# Patient Record
Sex: FEMALE | Race: WHITE | URBAN - METROPOLITAN AREA
[De-identification: names, ages, dates, MRNs, and addresses within clinical notes are randomized per-mention and may not be internally consistent; named-entity substitution may affect disease eponyms.]

---

## 2022-01-20 LAB
ABO, EXTERNAL RESULT: NORMAL
C. TRACHOMATIS, EXTERNAL RESULT: NEGATIVE
HEP B, EXTERNAL RESULT: NEGATIVE
HIV, EXTERNAL RESULT: NORMAL
N. GONORRHOEAE, EXTERNAL RESULT: NEGATIVE
RH FACTOR, EXTERNAL RESULT: NORMAL
RPR, EXTERNAL RESULT: NORMAL
RUBELLA TITER, EXTERNAL RESULT: NORMAL

## 2022-07-19 LAB — GBS, EXTERNAL RESULT: NEGATIVE

## 2022-07-30 ENCOUNTER — HOSPITAL ENCOUNTER (INPATIENT)
Age: 37
LOS: 3 days | Discharge: HOME | End: 2022-08-03
Attending: OBSTETRICS & GYNECOLOGY | Admitting: OBSTETRICS & GYNECOLOGY
Payer: COMMERCIAL

## 2022-07-30 PROCEDURE — 80307 DRUG TEST PRSMV CHEM ANLYZR: CPT

## 2022-07-30 PROCEDURE — 81001 URINALYSIS AUTO W/SCOPE: CPT

## 2022-07-30 RX ORDER — ONDANSETRON 2 MG/ML
4 INJECTION INTRAMUSCULAR; INTRAVENOUS EVERY 6 HOURS PRN
Status: DISCONTINUED | OUTPATIENT
Start: 2022-07-30 | End: 2022-07-31 | Stop reason: HOSPADM

## 2022-07-30 RX ORDER — FAMOTIDINE 10 MG/ML
20 INJECTION, SOLUTION INTRAVENOUS 2 TIMES DAILY PRN
Status: DISCONTINUED | OUTPATIENT
Start: 2022-07-30 | End: 2022-07-31 | Stop reason: HOSPADM

## 2022-07-30 RX ORDER — LEVOTHYROXINE SODIUM 0.1 MG/1
100 TABLET ORAL DAILY
COMMUNITY

## 2022-07-31 ENCOUNTER — ANESTHESIA (OUTPATIENT)
Dept: LABOR AND DELIVERY | Age: 37
End: 2022-07-31
Payer: COMMERCIAL

## 2022-07-31 ENCOUNTER — ANESTHESIA EVENT (OUTPATIENT)
Dept: LABOR AND DELIVERY | Age: 37
End: 2022-07-31
Payer: COMMERCIAL

## 2022-07-31 LAB
ABO/RH: NORMAL
AMPHETAMINES: NEGATIVE
ANTIBODY SCREEN: NEGATIVE
BACTERIA: ABNORMAL /HPF
BARBITURATE SCREEN URINE: NEGATIVE
BENZODIAZEPINE SCREEN, URINE: NEGATIVE
BILIRUBIN URINE: NEGATIVE MG/DL
BLOOD, URINE: ABNORMAL
CALCIUM OXALATE CRYSTALS: ABNORMAL /HPF
CANNABINOID SCREEN URINE: NEGATIVE
CLARITY: ABNORMAL
COCAINE METABOLITE: NEGATIVE
COLOR: YELLOW
GLUCOSE, URINE: NEGATIVE MG/DL
HCT VFR BLD CALC: 32.1 % (ref 37–47)
HEMOGLOBIN: 9.6 GM/DL (ref 12.5–16)
KETONES, URINE: NEGATIVE MG/DL
LEUKOCYTE ESTERASE, URINE: ABNORMAL
MCH RBC QN AUTO: 20.6 PG (ref 27–31)
MCHC RBC AUTO-ENTMCNC: 29.9 % (ref 32–36)
MCV RBC AUTO: 69 FL (ref 78–100)
MUCUS: ABNORMAL HPF
NITRITE URINE, QUANTITATIVE: NEGATIVE
OPIATES, URINE: NEGATIVE
OXYCODONE: NEGATIVE
PDW BLD-RTO: 19.4 % (ref 11.7–14.9)
PH, URINE: 7 (ref 5–8)
PHENCYCLIDINE, URINE: NEGATIVE
PLATELET # BLD: 303 K/CU MM (ref 140–440)
PMV BLD AUTO: 9.5 FL (ref 7.5–11.1)
PROTEIN UA: ABNORMAL MG/DL
RBC # BLD: 4.65 M/CU MM (ref 4.2–5.4)
RBC URINE: 102 /HPF (ref 0–6)
SPECIFIC GRAVITY UA: 1 (ref 1–1.03)
SQUAMOUS EPITHELIAL: 28 /HPF
UROBILINOGEN, URINE: 0.2 MG/DL (ref 0.2–1)
WBC # BLD: 8.5 K/CU MM (ref 4–10.5)
WBC CLUMP: ABNORMAL /HPF
WBC UA: 4 /HPF (ref 0–5)
YEAST: ABNORMAL /HPF

## 2022-07-31 PROCEDURE — 7100000000 HC PACU RECOVERY - FIRST 15 MIN: Performed by: OBSTETRICS & GYNECOLOGY

## 2022-07-31 PROCEDURE — 51702 INSERT TEMP BLADDER CATH: CPT

## 2022-07-31 PROCEDURE — 6360000002 HC RX W HCPCS: Performed by: NURSE ANESTHETIST, CERTIFIED REGISTERED

## 2022-07-31 PROCEDURE — 6360000002 HC RX W HCPCS: Performed by: OBSTETRICS & GYNECOLOGY

## 2022-07-31 PROCEDURE — 2500000003 HC RX 250 WO HCPCS: Performed by: NURSE ANESTHETIST, CERTIFIED REGISTERED

## 2022-07-31 PROCEDURE — 86900 BLOOD TYPING SEROLOGIC ABO: CPT

## 2022-07-31 PROCEDURE — 2580000003 HC RX 258: Performed by: OBSTETRICS & GYNECOLOGY

## 2022-07-31 PROCEDURE — 6370000000 HC RX 637 (ALT 250 FOR IP): Performed by: OBSTETRICS & GYNECOLOGY

## 2022-07-31 PROCEDURE — 1220000000 HC SEMI PRIVATE OB R&B

## 2022-07-31 PROCEDURE — 85027 COMPLETE CBC AUTOMATED: CPT

## 2022-07-31 PROCEDURE — 3700000001 HC ADD 15 MINUTES (ANESTHESIA): Performed by: OBSTETRICS & GYNECOLOGY

## 2022-07-31 PROCEDURE — 86850 RBC ANTIBODY SCREEN: CPT

## 2022-07-31 PROCEDURE — 7100000001 HC PACU RECOVERY - ADDTL 15 MIN: Performed by: OBSTETRICS & GYNECOLOGY

## 2022-07-31 PROCEDURE — 59514 CESAREAN DELIVERY ONLY: CPT | Performed by: OBSTETRICS & GYNECOLOGY

## 2022-07-31 PROCEDURE — 3700000000 HC ANESTHESIA ATTENDED CARE: Performed by: OBSTETRICS & GYNECOLOGY

## 2022-07-31 PROCEDURE — 86901 BLOOD TYPING SEROLOGIC RH(D): CPT

## 2022-07-31 PROCEDURE — 3609079900 HC CESAREAN SECTION: Performed by: OBSTETRICS & GYNECOLOGY

## 2022-07-31 PROCEDURE — 3700000025 EPIDURAL BLOCK: Performed by: NURSE ANESTHETIST, CERTIFIED REGISTERED

## 2022-07-31 PROCEDURE — 84112 EVAL AMNIOTIC FLUID PROTEIN: CPT

## 2022-07-31 RX ORDER — FAMOTIDINE 20 MG/1
20 TABLET, FILM COATED ORAL 2 TIMES DAILY PRN
Status: DISCONTINUED | OUTPATIENT
Start: 2022-07-31 | End: 2022-08-03 | Stop reason: HOSPADM

## 2022-07-31 RX ORDER — METHYLERGONOVINE MALEATE 0.2 MG/ML
200 INJECTION INTRAVENOUS PRN
Status: DISCONTINUED | OUTPATIENT
Start: 2022-07-31 | End: 2022-07-31

## 2022-07-31 RX ORDER — OXYCODONE HYDROCHLORIDE 5 MG/1
10 TABLET ORAL EVERY 4 HOURS PRN
Status: DISCONTINUED | OUTPATIENT
Start: 2022-07-31 | End: 2022-08-03 | Stop reason: HOSPADM

## 2022-07-31 RX ORDER — MORPHINE SULFATE 0.5 MG/ML
INJECTION, SOLUTION EPIDURAL; INTRATHECAL; INTRAVENOUS PRN
Status: DISCONTINUED | OUTPATIENT
Start: 2022-07-31 | End: 2022-07-31 | Stop reason: SDUPTHER

## 2022-07-31 RX ORDER — KETOROLAC TROMETHAMINE 30 MG/ML
30 INJECTION, SOLUTION INTRAMUSCULAR; INTRAVENOUS EVERY 6 HOURS PRN
Status: CANCELLED | OUTPATIENT
Start: 2022-07-31 | End: 2022-08-05

## 2022-07-31 RX ORDER — SODIUM CHLORIDE, SODIUM LACTATE, POTASSIUM CHLORIDE, CALCIUM CHLORIDE 600; 310; 30; 20 MG/100ML; MG/100ML; MG/100ML; MG/100ML
INJECTION, SOLUTION INTRAVENOUS CONTINUOUS
Status: DISCONTINUED | OUTPATIENT
Start: 2022-07-31 | End: 2022-07-31

## 2022-07-31 RX ORDER — ENOXAPARIN SODIUM 100 MG/ML
40 INJECTION SUBCUTANEOUS EVERY 24 HOURS
Status: DISCONTINUED | OUTPATIENT
Start: 2022-08-01 | End: 2022-08-03 | Stop reason: HOSPADM

## 2022-07-31 RX ORDER — SODIUM CHLORIDE 9 MG/ML
INJECTION, SOLUTION INTRAVENOUS PRN
Status: DISCONTINUED | OUTPATIENT
Start: 2022-07-31 | End: 2022-08-03 | Stop reason: HOSPADM

## 2022-07-31 RX ORDER — NALOXONE HYDROCHLORIDE 0.4 MG/ML
INJECTION, SOLUTION INTRAMUSCULAR; INTRAVENOUS; SUBCUTANEOUS PRN
Status: DISCONTINUED | OUTPATIENT
Start: 2022-07-31 | End: 2022-07-31 | Stop reason: HOSPADM

## 2022-07-31 RX ORDER — LIDOCAINE HYDROCHLORIDE AND EPINEPHRINE 20; 5 MG/ML; UG/ML
INJECTION, SOLUTION EPIDURAL; INFILTRATION; INTRACAUDAL; PERINEURAL PRN
Status: DISCONTINUED | OUTPATIENT
Start: 2022-07-31 | End: 2022-07-31 | Stop reason: SDUPTHER

## 2022-07-31 RX ORDER — SIMETHICONE 80 MG
80 TABLET,CHEWABLE ORAL EVERY 6 HOURS PRN
Status: DISCONTINUED | OUTPATIENT
Start: 2022-07-31 | End: 2022-08-03 | Stop reason: HOSPADM

## 2022-07-31 RX ORDER — DIPHENHYDRAMINE HYDROCHLORIDE 50 MG/ML
25 INJECTION INTRAMUSCULAR; INTRAVENOUS EVERY 6 HOURS PRN
Status: DISCONTINUED | OUTPATIENT
Start: 2022-07-31 | End: 2022-08-03 | Stop reason: HOSPADM

## 2022-07-31 RX ORDER — ONDANSETRON 2 MG/ML
4 INJECTION INTRAMUSCULAR; INTRAVENOUS EVERY 6 HOURS PRN
Status: DISCONTINUED | OUTPATIENT
Start: 2022-07-31 | End: 2022-07-31

## 2022-07-31 RX ORDER — SODIUM CHLORIDE 0.9 % (FLUSH) 0.9 %
5-40 SYRINGE (ML) INJECTION EVERY 12 HOURS SCHEDULED
Status: DISCONTINUED | OUTPATIENT
Start: 2022-07-31 | End: 2022-07-31

## 2022-07-31 RX ORDER — SODIUM CHLORIDE 0.9 % (FLUSH) 0.9 %
10 SYRINGE (ML) INJECTION PRN
Status: DISCONTINUED | OUTPATIENT
Start: 2022-07-31 | End: 2022-07-31

## 2022-07-31 RX ORDER — ONDANSETRON 4 MG/1
8 TABLET, ORALLY DISINTEGRATING ORAL EVERY 8 HOURS PRN
Status: DISCONTINUED | OUTPATIENT
Start: 2022-07-31 | End: 2022-08-03 | Stop reason: HOSPADM

## 2022-07-31 RX ORDER — LEVOTHYROXINE SODIUM 0.1 MG/1
100 TABLET ORAL DAILY
Status: DISCONTINUED | OUTPATIENT
Start: 2022-08-01 | End: 2022-08-03 | Stop reason: HOSPADM

## 2022-07-31 RX ORDER — SODIUM CHLORIDE 9 MG/ML
INJECTION, SOLUTION INTRAVENOUS PRN
Status: DISCONTINUED | OUTPATIENT
Start: 2022-07-31 | End: 2022-07-31

## 2022-07-31 RX ORDER — LANOLIN 100 %
OINTMENT (GRAM) TOPICAL
Status: DISCONTINUED | OUTPATIENT
Start: 2022-07-31 | End: 2022-08-03 | Stop reason: HOSPADM

## 2022-07-31 RX ORDER — KETOROLAC TROMETHAMINE 30 MG/ML
INJECTION, SOLUTION INTRAMUSCULAR; INTRAVENOUS PRN
Status: DISCONTINUED | OUTPATIENT
Start: 2022-07-31 | End: 2022-07-31 | Stop reason: SDUPTHER

## 2022-07-31 RX ORDER — SODIUM CHLORIDE 9 MG/ML
25 INJECTION, SOLUTION INTRAVENOUS PRN
Status: DISCONTINUED | OUTPATIENT
Start: 2022-07-31 | End: 2022-07-31

## 2022-07-31 RX ORDER — SODIUM CHLORIDE 0.9 % (FLUSH) 0.9 %
10 SYRINGE (ML) INJECTION EVERY 12 HOURS SCHEDULED
Status: DISCONTINUED | OUTPATIENT
Start: 2022-07-31 | End: 2022-07-31

## 2022-07-31 RX ORDER — CARBOPROST TROMETHAMINE 250 UG/ML
250 INJECTION, SOLUTION INTRAMUSCULAR PRN
Status: DISCONTINUED | OUTPATIENT
Start: 2022-07-31 | End: 2022-07-31

## 2022-07-31 RX ORDER — ROPIVACAINE HYDROCHLORIDE 2 MG/ML
12 INJECTION, SOLUTION EPIDURAL; INFILTRATION; PERINEURAL CONTINUOUS
Status: DISCONTINUED | OUTPATIENT
Start: 2022-07-31 | End: 2022-07-31

## 2022-07-31 RX ORDER — FENTANYL CITRATE 50 UG/ML
INJECTION, SOLUTION INTRAMUSCULAR; INTRAVENOUS PRN
Status: DISCONTINUED | OUTPATIENT
Start: 2022-07-31 | End: 2022-07-31 | Stop reason: SDUPTHER

## 2022-07-31 RX ORDER — KETOROLAC TROMETHAMINE 30 MG/ML
30 INJECTION, SOLUTION INTRAMUSCULAR; INTRAVENOUS EVERY 6 HOURS
Status: DISPENSED | OUTPATIENT
Start: 2022-07-31 | End: 2022-08-01

## 2022-07-31 RX ORDER — SODIUM CHLORIDE, SODIUM LACTATE, POTASSIUM CHLORIDE, AND CALCIUM CHLORIDE .6; .31; .03; .02 G/100ML; G/100ML; G/100ML; G/100ML
500 INJECTION, SOLUTION INTRAVENOUS PRN
Status: DISCONTINUED | OUTPATIENT
Start: 2022-07-31 | End: 2022-07-31

## 2022-07-31 RX ORDER — SWAB
1 SWAB, NON-MEDICATED MISCELLANEOUS DAILY
Status: DISCONTINUED | OUTPATIENT
Start: 2022-08-01 | End: 2022-08-03 | Stop reason: HOSPADM

## 2022-07-31 RX ORDER — SODIUM CHLORIDE 0.9 % (FLUSH) 0.9 %
5-40 SYRINGE (ML) INJECTION EVERY 12 HOURS SCHEDULED
Status: DISCONTINUED | OUTPATIENT
Start: 2022-07-31 | End: 2022-08-03 | Stop reason: HOSPADM

## 2022-07-31 RX ORDER — SODIUM CHLORIDE, SODIUM LACTATE, POTASSIUM CHLORIDE, CALCIUM CHLORIDE 600; 310; 30; 20 MG/100ML; MG/100ML; MG/100ML; MG/100ML
INJECTION, SOLUTION INTRAVENOUS CONTINUOUS
Status: DISCONTINUED | OUTPATIENT
Start: 2022-07-31 | End: 2022-08-03 | Stop reason: HOSPADM

## 2022-07-31 RX ORDER — METHYLERGONOVINE MALEATE 0.2 MG/ML
200 INJECTION INTRAVENOUS PRN
Status: DISCONTINUED | OUTPATIENT
Start: 2022-07-31 | End: 2022-08-03 | Stop reason: HOSPADM

## 2022-07-31 RX ORDER — ACETAMINOPHEN 500 MG
1000 TABLET ORAL EVERY 8 HOURS
Status: DISCONTINUED | OUTPATIENT
Start: 2022-07-31 | End: 2022-08-03 | Stop reason: HOSPADM

## 2022-07-31 RX ORDER — BISACODYL 10 MG
10 SUPPOSITORY, RECTAL RECTAL DAILY PRN
Status: DISCONTINUED | OUTPATIENT
Start: 2022-07-31 | End: 2022-08-03 | Stop reason: HOSPADM

## 2022-07-31 RX ORDER — SODIUM CHLORIDE 0.9 % (FLUSH) 0.9 %
5-40 SYRINGE (ML) INJECTION PRN
Status: DISCONTINUED | OUTPATIENT
Start: 2022-07-31 | End: 2022-08-03 | Stop reason: HOSPADM

## 2022-07-31 RX ORDER — SODIUM CHLORIDE 0.9 % (FLUSH) 0.9 %
5-40 SYRINGE (ML) INJECTION EVERY 12 HOURS SCHEDULED
Status: CANCELLED | OUTPATIENT
Start: 2022-07-31

## 2022-07-31 RX ORDER — SODIUM CHLORIDE 0.9 % (FLUSH) 0.9 %
5-40 SYRINGE (ML) INJECTION PRN
Status: DISCONTINUED | OUTPATIENT
Start: 2022-07-31 | End: 2022-07-31

## 2022-07-31 RX ORDER — DOCUSATE SODIUM 100 MG/1
100 CAPSULE, LIQUID FILLED ORAL 2 TIMES DAILY
Status: DISCONTINUED | OUTPATIENT
Start: 2022-07-31 | End: 2022-08-03 | Stop reason: HOSPADM

## 2022-07-31 RX ORDER — ROPIVACAINE HYDROCHLORIDE 2 MG/ML
INJECTION, SOLUTION EPIDURAL; INFILTRATION; PERINEURAL CONTINUOUS PRN
Status: DISCONTINUED | OUTPATIENT
Start: 2022-07-31 | End: 2022-07-31 | Stop reason: SDUPTHER

## 2022-07-31 RX ORDER — DOCUSATE SODIUM 100 MG/1
100 CAPSULE, LIQUID FILLED ORAL 2 TIMES DAILY
Status: DISCONTINUED | OUTPATIENT
Start: 2022-07-31 | End: 2022-07-31 | Stop reason: HOSPADM

## 2022-07-31 RX ORDER — FENTANYL CITRATE 50 UG/ML
25 INJECTION, SOLUTION INTRAMUSCULAR; INTRAVENOUS EVERY 5 MIN PRN
Status: CANCELLED | OUTPATIENT
Start: 2022-07-31

## 2022-07-31 RX ORDER — SODIUM CHLORIDE, SODIUM LACTATE, POTASSIUM CHLORIDE, AND CALCIUM CHLORIDE .6; .31; .03; .02 G/100ML; G/100ML; G/100ML; G/100ML
1000 INJECTION, SOLUTION INTRAVENOUS PRN
Status: DISCONTINUED | OUTPATIENT
Start: 2022-07-31 | End: 2022-07-31

## 2022-07-31 RX ORDER — ROPIVACAINE HYDROCHLORIDE 2 MG/ML
INJECTION, SOLUTION EPIDURAL; INFILTRATION; PERINEURAL PRN
Status: DISCONTINUED | OUTPATIENT
Start: 2022-07-31 | End: 2022-07-31 | Stop reason: SDUPTHER

## 2022-07-31 RX ORDER — SODIUM CHLORIDE, SODIUM LACTATE, POTASSIUM CHLORIDE, AND CALCIUM CHLORIDE .6; .31; .03; .02 G/100ML; G/100ML; G/100ML; G/100ML
1000 INJECTION, SOLUTION INTRAVENOUS ONCE
Status: DISCONTINUED | OUTPATIENT
Start: 2022-07-31 | End: 2022-07-31

## 2022-07-31 RX ORDER — ONDANSETRON 2 MG/ML
INJECTION INTRAMUSCULAR; INTRAVENOUS PRN
Status: DISCONTINUED | OUTPATIENT
Start: 2022-07-31 | End: 2022-07-31 | Stop reason: SDUPTHER

## 2022-07-31 RX ORDER — ONDANSETRON 2 MG/ML
4 INJECTION INTRAMUSCULAR; INTRAVENOUS EVERY 6 HOURS PRN
Status: DISCONTINUED | OUTPATIENT
Start: 2022-07-31 | End: 2022-08-03 | Stop reason: HOSPADM

## 2022-07-31 RX ORDER — OXYCODONE HYDROCHLORIDE 5 MG/1
5 TABLET ORAL EVERY 4 HOURS PRN
Status: DISCONTINUED | OUTPATIENT
Start: 2022-07-31 | End: 2022-08-03 | Stop reason: HOSPADM

## 2022-07-31 RX ORDER — IBUPROFEN 800 MG/1
800 TABLET ORAL EVERY 8 HOURS
Status: DISCONTINUED | OUTPATIENT
Start: 2022-08-01 | End: 2022-07-31

## 2022-07-31 RX ORDER — TRANEXAMIC ACID 10 MG/ML
1000 INJECTION, SOLUTION INTRAVENOUS
Status: DISCONTINUED | OUTPATIENT
Start: 2022-07-31 | End: 2022-07-31

## 2022-07-31 RX ORDER — MISOPROSTOL 200 UG/1
800 TABLET ORAL PRN
Status: DISCONTINUED | OUTPATIENT
Start: 2022-07-31 | End: 2022-08-03 | Stop reason: HOSPADM

## 2022-07-31 RX ORDER — TRISODIUM CITRATE DIHYDRATE AND CITRIC ACID MONOHYDRATE 500; 334 MG/5ML; MG/5ML
30 SOLUTION ORAL ONCE
Status: COMPLETED | OUTPATIENT
Start: 2022-07-31 | End: 2022-07-31

## 2022-07-31 RX ORDER — IBUPROFEN 800 MG/1
800 TABLET ORAL EVERY 8 HOURS
Status: DISCONTINUED | OUTPATIENT
Start: 2022-08-02 | End: 2022-08-03 | Stop reason: HOSPADM

## 2022-07-31 RX ORDER — MISOPROSTOL 200 UG/1
800 TABLET ORAL PRN
Status: DISCONTINUED | OUTPATIENT
Start: 2022-07-31 | End: 2022-07-31

## 2022-07-31 RX ORDER — FENTANYL CITRATE 50 UG/ML
100 INJECTION, SOLUTION INTRAMUSCULAR; INTRAVENOUS
Status: DISCONTINUED | OUTPATIENT
Start: 2022-07-31 | End: 2022-07-31 | Stop reason: HOSPADM

## 2022-07-31 RX ORDER — LIDOCAINE HYDROCHLORIDE AND EPINEPHRINE 15; 5 MG/ML; UG/ML
INJECTION, SOLUTION EPIDURAL PRN
Status: DISCONTINUED | OUTPATIENT
Start: 2022-07-31 | End: 2022-07-31 | Stop reason: SDUPTHER

## 2022-07-31 RX ORDER — FENTANYL CITRATE 50 UG/ML
50 INJECTION, SOLUTION INTRAMUSCULAR; INTRAVENOUS EVERY 5 MIN PRN
Status: CANCELLED | OUTPATIENT
Start: 2022-07-31

## 2022-07-31 RX ORDER — NICOTINE 21 MG/24HR
1 PATCH, TRANSDERMAL 24 HOURS TRANSDERMAL DAILY
Status: DISCONTINUED | OUTPATIENT
Start: 2022-07-31 | End: 2022-08-03 | Stop reason: HOSPADM

## 2022-07-31 RX ORDER — LIDOCAINE HYDROCHLORIDE 10 MG/ML
30 INJECTION, SOLUTION EPIDURAL; INFILTRATION; INTRACAUDAL; PERINEURAL PRN
Status: DISCONTINUED | OUTPATIENT
Start: 2022-07-31 | End: 2022-07-31

## 2022-07-31 RX ADMIN — ONDANSETRON 4 MG: 2 INJECTION INTRAMUSCULAR; INTRAVENOUS at 17:00

## 2022-07-31 RX ADMIN — FENTANYL CITRATE 50 MCG: 50 INJECTION, SOLUTION INTRAMUSCULAR; INTRAVENOUS at 10:46

## 2022-07-31 RX ADMIN — KETOROLAC TROMETHAMINE 30 MG: 30 INJECTION, SOLUTION INTRAMUSCULAR at 16:43

## 2022-07-31 RX ADMIN — ROPIVACAINE HYDROCHLORIDE 10 ML/HR: 2 INJECTION, SOLUTION EPIDURAL; INFILTRATION; PERINEURAL at 02:12

## 2022-07-31 RX ADMIN — ACETAMINOPHEN 1000 MG: 500 TABLET ORAL at 21:08

## 2022-07-31 RX ADMIN — SODIUM CITRATE AND CITRIC ACID MONOHYDRATE 30 ML: 500; 334 SOLUTION ORAL at 15:43

## 2022-07-31 RX ADMIN — ONDANSETRON 4 MG: 2 INJECTION INTRAMUSCULAR; INTRAVENOUS at 15:44

## 2022-07-31 RX ADMIN — FENTANYL CITRATE 50 MCG: 50 INJECTION, SOLUTION INTRAMUSCULAR; INTRAVENOUS at 14:40

## 2022-07-31 RX ADMIN — AZITHROMYCIN MONOHYDRATE 500 MG: 500 INJECTION, POWDER, LYOPHILIZED, FOR SOLUTION INTRAVENOUS at 16:35

## 2022-07-31 RX ADMIN — SODIUM CHLORIDE, POTASSIUM CHLORIDE, SODIUM LACTATE AND CALCIUM CHLORIDE: 600; 310; 30; 20 INJECTION, SOLUTION INTRAVENOUS at 11:45

## 2022-07-31 RX ADMIN — LIDOCAINE HYDROCHLORIDE,EPINEPHRINE BITARTRATE 3 ML: 15; .005 INJECTION, SOLUTION EPIDURAL; INFILTRATION; INTRACAUDAL; PERINEURAL at 02:00

## 2022-07-31 RX ADMIN — ROPIVACAINE HYDROCHLORIDE 2 ML: 2 INJECTION, SOLUTION EPIDURAL; INFILTRATION at 14:40

## 2022-07-31 RX ADMIN — ONDANSETRON 4 MG: 2 INJECTION INTRAMUSCULAR; INTRAVENOUS at 03:28

## 2022-07-31 RX ADMIN — SODIUM CHLORIDE, POTASSIUM CHLORIDE, SODIUM LACTATE AND CALCIUM CHLORIDE: 600; 310; 30; 20 INJECTION, SOLUTION INTRAVENOUS at 00:17

## 2022-07-31 RX ADMIN — Medication 1 MILLI-UNITS/MIN: at 00:53

## 2022-07-31 RX ADMIN — CEFAZOLIN SODIUM 1000 MG: 1 INJECTION, POWDER, FOR SOLUTION INTRAMUSCULAR; INTRAVENOUS at 15:45

## 2022-07-31 RX ADMIN — ROPIVACAINE HYDROCHLORIDE 12 ML/HR: 2 INJECTION, SOLUTION EPIDURAL; INFILTRATION at 08:42

## 2022-07-31 RX ADMIN — SODIUM CHLORIDE, POTASSIUM CHLORIDE, SODIUM LACTATE AND CALCIUM CHLORIDE: 600; 310; 30; 20 INJECTION, SOLUTION INTRAVENOUS at 04:42

## 2022-07-31 RX ADMIN — ROPIVACAINE HYDROCHLORIDE 8 ML: 2 INJECTION, SOLUTION EPIDURAL; INFILTRATION at 02:08

## 2022-07-31 RX ADMIN — DOCUSATE SODIUM 100 MG: 100 CAPSULE, LIQUID FILLED ORAL at 21:08

## 2022-07-31 RX ADMIN — SODIUM CHLORIDE, POTASSIUM CHLORIDE, SODIUM LACTATE AND CALCIUM CHLORIDE: 600; 310; 30; 20 INJECTION, SOLUTION INTRAVENOUS at 01:57

## 2022-07-31 RX ADMIN — MORPHINE SULFATE 1 MG: 0.5 INJECTION, SOLUTION EPIDURAL; INTRATHECAL; INTRAVENOUS at 16:36

## 2022-07-31 RX ADMIN — ROPIVACAINE HYDROCHLORIDE 3 ML: 2 INJECTION, SOLUTION EPIDURAL; INFILTRATION at 04:55

## 2022-07-31 RX ADMIN — Medication 999 ML/HR: at 16:17

## 2022-07-31 RX ADMIN — MORPHINE SULFATE 3 MG: 0.5 INJECTION, SOLUTION EPIDURAL; INTRATHECAL; INTRAVENOUS at 16:15

## 2022-07-31 RX ADMIN — Medication 87.3 MILLI-UNITS/MIN: at 16:59

## 2022-07-31 RX ADMIN — LIDOCAINE HYDROCHLORIDE AND EPINEPHRINE 5 ML: 20; 5 INJECTION, SOLUTION EPIDURAL; INFILTRATION; INTRACAUDAL; PERINEURAL at 15:52

## 2022-07-31 RX ADMIN — LIDOCAINE HYDROCHLORIDE AND EPINEPHRINE 3 ML: 20; 5 INJECTION, SOLUTION EPIDURAL; INFILTRATION; INTRACAUDAL; PERINEURAL at 15:56

## 2022-07-31 RX ADMIN — DIPHENHYDRAMINE HYDROCHLORIDE 25 MG: 50 INJECTION, SOLUTION INTRAMUSCULAR; INTRAVENOUS at 21:08

## 2022-07-31 RX ADMIN — ROPIVACAINE HYDROCHLORIDE 2 ML: 2 INJECTION, SOLUTION EPIDURAL; INFILTRATION at 10:46

## 2022-07-31 RX ADMIN — Medication 10 ML: at 21:09

## 2022-07-31 RX ADMIN — SODIUM CHLORIDE, POTASSIUM CHLORIDE, SODIUM LACTATE AND CALCIUM CHLORIDE: 600; 310; 30; 20 INJECTION, SOLUTION INTRAVENOUS at 19:09

## 2022-07-31 ASSESSMENT — PAIN SCALES - GENERAL
PAINLEVEL_OUTOF10: 0
PAINLEVEL_OUTOF10: 6
PAINLEVEL_OUTOF10: 2
PAINLEVEL_OUTOF10: 5
PAINLEVEL_OUTOF10: 10
PAINLEVEL_OUTOF10: 3
PAINLEVEL_OUTOF10: 4
PAINLEVEL_OUTOF10: 5

## 2022-07-31 ASSESSMENT — PAIN DESCRIPTION - LOCATION
LOCATION: ABDOMEN
LOCATION: GENERALIZED;ABDOMEN;BACK
LOCATION: ABDOMEN

## 2022-07-31 ASSESSMENT — PAIN DESCRIPTION - DESCRIPTORS
DESCRIPTORS: CRAMPING
DESCRIPTORS: PRESSURE
DESCRIPTORS: PRESSURE
DESCRIPTORS: CRAMPING
DESCRIPTORS: PRESSURE
DESCRIPTORS: CRAMPING
DESCRIPTORS: PRESSURE;SHARP

## 2022-07-31 ASSESSMENT — PAIN DESCRIPTION - PAIN TYPE
TYPE: ACUTE PAIN

## 2022-07-31 ASSESSMENT — PAIN DESCRIPTION - ORIENTATION
ORIENTATION: LOWER;MID
ORIENTATION: LOWER;MID
ORIENTATION: LOWER
ORIENTATION: LOWER
ORIENTATION: LOWER;MID
ORIENTATION: LEFT;LOWER
ORIENTATION: LEFT;LOWER

## 2022-07-31 ASSESSMENT — PAIN DESCRIPTION - FREQUENCY
FREQUENCY: CONTINUOUS
FREQUENCY: CONTINUOUS
FREQUENCY: INTERMITTENT
FREQUENCY: CONTINUOUS

## 2022-07-31 ASSESSMENT — PAIN - FUNCTIONAL ASSESSMENT

## 2022-07-31 ASSESSMENT — PAIN DESCRIPTION - ONSET
ONSET: ON-GOING

## 2022-07-31 NOTE — PROGRESS NOTES
Department of Obstetrics and Gynecology  Labor and Delivery   Midwifery Progress Note      SUBJECTIVE:  Blue Long is laying in bed on her R side. She is having some pain, but is overall comfortable.      OBJECTIVE:      Fetal heart rate:       Baseline Heart Rate:  125        Accelerations:  absent       Variability:  minimal; likely sleep cycle, periods of moderate variability        Decelerations:  early         Contraction frequency: 1-3 minutes    Membranes:  Ruptured clear fluid    Cervix: exam by RN          Dilation:  8 cm         Effacement:  80         Station:  -1         Position:  anterior             ASSESSMENT     39 yo  at 38.4 weeks  SROM clear fluid  Cat II FHR tracing; overall reassuring  Labor progressing slowly     PLAN:    Will reposition to semifowlers with peanut ball under L leg  Will reassess in 2-4 hours and/or PRN    Shelby Krishnan, APRN - JUSTAM

## 2022-07-31 NOTE — PROGRESS NOTES
Arrest of dialtion  Currently category 1 tracing (has been category 2)  See entire labor curve. I recommend  section at this point. Discussed that we could continue labor as an alternative but her labor has been protracted. I discussed with with the patient the procedure of a  section. I discussed the risk of surgery including infection, hemorrhage, blood transfusion, injury to organs such as bowel bladder neurovascular structures. All questions answered, she desires to proceed with  Section.

## 2022-07-31 NOTE — PROGRESS NOTES
Department of Obstetrics and Gynecology  Labor and Delivery   Midwifery Progress Note      SUBJECTIVE:  Livia is reporting LL quadrant pain that has worsened over the last hour. OBJECTIVE:      Fetal heart rate:       Baseline Heart Rate:  125        Accelerations:  present       Variability:  moderate       Decelerations:  early         Contraction frequency: 1-3 minutes    Membranes:  Ruptured clear fluid    Cervix:         Dilation:  Anterior lip          Effacement:  100%         Station:  +1         Position:  anterior             ASSESSMENT     39 yo  at 38.4  SROM clear fluid  GBS negative   Labor progressing  Cat I FHR tracing    PLAN:    Will reassess SVE in 1-2 hours   IUPC out and in the bed prior to repeat SVE. Will replace TOCO. I have collaborated and updated Dr Marii Pratt  and the MD agrees with the current POC.       MALIHA Ching CNM

## 2022-07-31 NOTE — ANESTHESIA PRE PROCEDURE
Department of Anesthesiology  Preprocedure Note       Name:  Caro Henderson   Age:  40 y.o.  :  1985                                          MRN:  [id number]         Date:  2022      Surgeon: * No surgeons listed *    Procedure: * No procedures listed *    Medications prior to admission:   Prior to Admission medications    Medication Sig Start Date End Date Taking? Authorizing Provider   levothyroxine (SYNTHROID) 100 MCG tablet Take 100 mcg by mouth in the morning.    Yes Historical Provider, MD       Current medications:    Current Facility-Administered Medications   Medication Dose Route Frequency Provider Last Rate Last Admin    lactated ringers infusion   IntraVENous Continuous Evetta Clare Hassan  mL/hr at 22 0157 New Bag at 22 0157    lactated ringers bolus  500 mL IntraVENous PRN Sadi Davila MD        Or    lactated ringers bolus  1,000 mL IntraVENous PRN Sadi Davila MD        sodium chloride flush 0.9 % injection 5-40 mL  5-40 mL IntraVENous 2 times per day Sadi Davila MD        sodium chloride flush 0.9 % injection 5-40 mL  5-40 mL IntraVENous PRN Sadi Davila MD        0.9 % sodium chloride infusion  25 mL IntraVENous PRN Sadi Davila MD        oxytocin (PITOCIN) 30 units in 500 mL infusion  1-20 aranza-units/min IntraVENous Continuous Sadi Davila MD 2 mL/hr at 22 0215 2 aranza-units/min at 22 0215    methylergonovine (METHERGINE) injection 200 mcg  200 mcg IntraMUSCular PRN Sadi Davila MD        carboprost (HEMABATE) injection 250 mcg  250 mcg IntraMUSCular PRN Sadi Davila MD        miSOPROStol (CYTOTEC) tablet 800 mcg  800 mcg Rectal PRN Sadi Davila MD        tranexamic acid-NaCl IVPB premix 1,000 mg  1,000 mg IntraVENous Once PRN Sadi Davila MD        oxytocin (PITOCIN) 30 units in 500 mL infusion  87.3 aranza-units/min Counseling given: No      Vital Signs (Current):   Vitals:    07/30/22 2325 07/31/22 0045 07/31/22 0135   BP: 139/80  138/78   Pulse: 91  74   Resp: 20  18   Temp: 36.9 °C (98.5 °F)  36.8 °C (98.3 °F)   TempSrc: Oral  Oral   SpO2: 99% 99% 99%   Weight: 162 lb (73.5 kg)     Height: 5' 3\" (1.6 m)                                                BP Readings from Last 3 Encounters:   07/31/22 138/78       NPO Status:                                                                                 BMI:   Wt Readings from Last 3 Encounters:   07/30/22 162 lb (73.5 kg)     Body mass index is 28.7 kg/m². CBC:   Lab Results   Component Value Date/Time    WBC 8.5 07/31/2022 12:17 AM    RBC 4.65 07/31/2022 12:17 AM    HGB 9.6 07/31/2022 12:17 AM    HCT 32.1 07/31/2022 12:17 AM    MCV 69.0 07/31/2022 12:17 AM    RDW 19.4 07/31/2022 12:17 AM     07/31/2022 12:17 AM       CMP: No results found for: NA, K, CL, CO2, BUN, CREATININE, GFRAA, AGRATIO, LABGLOM, GLUCOSE, GLU, PROT, CALCIUM, BILITOT, ALKPHOS, AST, ALT    POC Tests: No results for input(s): POCGLU, POCNA, POCK, POCCL, POCBUN, POCHEMO, POCHCT in the last 72 hours.     Coags: No results found for: PROTIME, INR, APTT    HCG (If Applicable): No results found for: PREGTESTUR, PREGSERUM, HCG, HCGQUANT     ABGs: No results found for: PHART, PO2ART, EMU1WMH, OAI9ROZ, BEART, R9ZHJOPT     Type & Screen (If Applicable):  No results found for: LABABO, LABRH    Drug/Infectious Status (If Applicable):  No results found for: HIV, HEPCAB    COVID-19 Screening (If Applicable): No results found for: COVID19        Anesthesia Evaluation    Airway: Mallampati: II  TM distance: >3 FB   Neck ROM: full  Mouth opening: > = 3 FB   Dental: normal exam         Pulmonary:Negative Pulmonary ROS and normal exam                               Cardiovascular:  Exercise tolerance: good (>4 METS),           Rhythm: regular  Rate: normal                    Neuro/Psych:   Negative Neuro/Psych ROS              GI/Hepatic/Renal: Neg GI/Hepatic/Renal ROS            Endo/Other:    (+) hypothyroidism::., .                 Abdominal:              PE comment: pregnant   Vascular: negative vascular ROS. Other Findings:           Anesthesia Plan      epidural     ASA 2             Anesthetic plan and risks discussed with patient. Plan discussed with CRNA.                     MALIHA Baron - CRNA   7/31/2022

## 2022-07-31 NOTE — H&P
Department of Obstetrics and Gynecology   Obstetrics History and Physical        CHIEF COMPLAINT:  leakage of amniotic fluid    HISTORY OF PRESENT ILLNESS:      The patient is a 40 y.o. female at 38w3d. OB History          1    Para        Term                AB        Living             SAB        IAB        Ectopic        Molar        Multiple        Live Births                Patient presents with a chief complaint as above and is being admitted for SROM    Estimated Due Date: Estimated Date of Delivery: 8/10/22    PRENATAL CARE:    Complicated by: fetal arachnoid cyst    PAST OB HISTORY  OB History          1    Para        Term                AB        Living             SAB        IAB        Ectopic        Molar        Multiple        Live Births                    Past Medical History:        Diagnosis Date    Thyroid disease      Past Surgical History:    History reviewed. No pertinent surgical history.   Allergies:  Latex  Social History:    Social History     Socioeconomic History    Marital status: Single     Spouse name: Not on file    Number of children: Not on file    Years of education: Not on file    Highest education level: Not on file   Occupational History    Not on file   Tobacco Use    Smoking status: Never    Smokeless tobacco: Never   Vaping Use    Vaping Use: Not on file   Substance and Sexual Activity    Alcohol use: Not Currently    Drug use: Never    Sexual activity: Yes     Partners: Male   Other Topics Concern    Not on file   Social History Narrative    Not on file     Social Determinants of Health     Financial Resource Strain: Not on file   Food Insecurity: Not on file   Transportation Needs: Not on file   Physical Activity: Not on file   Stress: Not on file   Social Connections: Not on file   Intimate Partner Violence: Not on file   Housing Stability: Not on file     Family History:       Problem Relation Age of Onset    Asthma Mother     Cancer Maternal Grandmother     Cancer Maternal Grandfather      Medications Prior to Admission:  Medications Prior to Admission: levothyroxine (SYNTHROID) 100 MCG tablet, Take 100 mcg by mouth in the morning. REVIEW OF SYSTEMS:    CONSTITUTIONAL:  negative  RESPIRATORY:  negative  CARDIOVASCULAR:  negative  GASTROINTESTINAL:  negative  ALLERGIC/IMMUNOLOGIC:  negative  NEUROLOGICAL:  negative  BEHAVIOR/PSYCH:  negative    PHYSICAL EXAM:  Blood pressure 138/78, pulse 74, temperature 98.3 °F (36.8 °C), temperature source Oral, resp. rate 18, height 5' 3\" (1.6 m), weight 162 lb (73.5 kg), SpO2 99 %. General appearance:  awake, alert, cooperative, no apparent distress, and appears stated age  Neurologic:  Awake, alert, oriented to name, place and time.     Lungs:  No increased work of breathing, good air exchange  Abdomen:  Soft, non tender, gravid, consistent with her gestational age, EFW by Goyo's manouever was 3500grams  Fetal heart rate:    Category 1    Pelvis:  Adequate pelvis  Cervix: 1/50/-2    Contraction frequency:  8 minutes    Membranes:  Ruptured clear fluid    ASSESSMENT AND PLAN:    IUP at 38w4d with SROM  -notify nursery of fetal arachnoid cyst  -augment labor as necessary  -GBS negative

## 2022-07-31 NOTE — PLAN OF CARE
Problem: Pain  Goal: Verbalizes/displays adequate comfort level or baseline comfort level  Outcome: Progressing     Problem: Vaginal Birth or  Section  Goal: Fetal and maternal status remain reassuring during the birth process  Description:  Birth OB-Pregnancy care plan goal which identifies if the fetal and maternal status remain reassuring during the birth process  Outcome: Progressing     Problem: Infection - Adult  Goal: Absence of infection at discharge  Outcome: Progressing  Goal: Absence of infection during hospitalization  Outcome: Progressing  Goal: Absence of fever/infection during anticipated neutropenic period  Outcome: Progressing     Problem: Safety - Adult  Goal: Free from fall injury  Outcome: Progressing     Problem: Discharge Planning  Goal: Discharge to home or other facility with appropriate resources  Outcome: Progressing     Problem: Chronic Conditions and Co-morbidities  Goal: Patient's chronic conditions and co-morbidity symptoms are monitored and maintained or improved  Outcome: Progressing

## 2022-07-31 NOTE — PROGRESS NOTES
Department of Obstetrics and Gynecology  Labor and Delivery   Midwifery Progress Note      SUBJECTIVE:  Sachin Richardson is feeling worsening pain on her L side. Called to bedside by RN for concern regarding minimal FHR variability     OBJECTIVE:      Fetal heart rate:       Baseline Heart Rate:  120        Accelerations:  present; accel with scalp stim       Variability: periods of minimal and moderate variability        Decelerations:  early         Contraction frequency: 3-5 minutes    Membranes:  Ruptured clear fluid    Cervix:         Dilation:  Anterior lip          Effacement:  100%         Station:  +1         Position:  anterior               ASSESSMENT     41 yo  at 38.4  SROM for clear fluid  GBS negative  Labor not progressing  Cat II FHR tracing     PLAN:    Attempted to reduce cervical lip x 2 without success. FHR decelerations to the 80-90s x multiple minutes following each pushing attempt. Dr. Giuseppe Urban called and notified. Decision made to proceed with . Discussed rationale for  with pt and partner. They expressed understanding and denied questions/concerns at this time.      MALIHA Quinonez CNM

## 2022-07-31 NOTE — FLOWSHEET NOTE
PACU care completed. Patient ready to be transferred to [facility] via bed. Infant will be pushed to room by FOB via crib cart.

## 2022-07-31 NOTE — FLOWSHEET NOTE
Pt arrived ambulatory to L&D W/ Complaints of leaking fluid. Pt placed in LT03 and instructed to get clean catch urine sample and change into gown. Pt verbalized understanding.

## 2022-07-31 NOTE — ANESTHESIA PROCEDURE NOTES
Epidural Block    Patient location during procedure: OB  Start time: 7/31/2022 1:54 AM  End time: 7/31/2022 1:58 AM  Reason for block: labor epidural  Staffing  Performed: resident/CRNA   Anesthesiologist: Za Garner MD  Resident/CRNA: Sol James APRN - CRNA  Epidural  Patient position: sitting  Prep: ChloraPrep  Patient monitoring: cardiac monitor, continuous pulse ox, capnometry and frequent blood pressure checks  Approach: midline  Location: L3-4  Injection technique: MAL saline  Provider prep: mask and sterile gloves  Needle  Needle type: Tuohy   Needle gauge: 17 G  Needle length: 3.5 in  Needle insertion depth: 5.5 cm  Catheter type: side hole  Catheter size: 20 G  Catheter at skin depth: 10 cm  Test dose: negativeCatheter Secured: tape and tegaderm  Assessment  Sensory level: T6  Hemodynamics: stable  Attempts: 1  Outcomes: patient tolerated procedure well  Preanesthetic Checklist  Completed: patient identified, IV checked, site marked, risks and benefits discussed, surgical/procedural consents, equipment checked, pre-op evaluation, timeout performed, anesthesia consent given, oxygen available, monitors applied/VS acknowledged, fire risk safety assessment completed and verbalized and blood product R/B/A discussed and consented

## 2022-07-31 NOTE — FLOWSHEET NOTE
Amnioinfusion bolus completed. Rate changed to 150ml/hr amnioinfusion. Pads checked for fluid return. Patient denies needs at this time.

## 2022-07-31 NOTE — ANESTHESIA POSTPROCEDURE EVALUATION
Department of Anesthesiology  Postprocedure Note    Patient: Pamela Peters  MRN: [id number]  YOB: 1985  Date of evaluation: 2022      Procedure Summary     Date: 22 Room / Location: [address] [place] [address]    Anesthesia Start: 139 Anesthesia Stop:     Procedures:        SECTION (Abdomen)      Labor Analgesia Diagnosis:       Labor and delivery, indication for care      (Labor and delivery, indication for care [O75.9])    Surgeons: Estefani Lawler MD Responsible Provider: MALIHA Vickers CRNA    Anesthesia Type: epidural ASA Status: 2          Anesthesia Type: No value filed.     Harsha Phase I:      Harsha Phase II:        Anesthesia Post Evaluation    Patient location during evaluation: PACU  Patient participation: complete - patient participated  Level of consciousness: awake and alert  Pain score: 2  Airway patency: patent  Nausea & Vomiting: no vomiting and no nausea  Complications: no  Cardiovascular status: blood pressure returned to baseline and hemodynamically stable  Respiratory status: acceptable, room air, spontaneous ventilation and nonlabored ventilation  Hydration status: stable

## 2022-07-31 NOTE — FLOWSHEET NOTE
SVE exam performed by [facility]. IUPC out after exam. Amnioinfusion stopped. TOCO replaced. Patient placed in high fowlers position with peanut ball under her right leg. Patient denies any other needs at this time. Coping with pain.

## 2022-07-31 NOTE — OP NOTE
PATIENT:    Maureen Garcia    PREOPERATIVE DIAGNOSES:  1.  38w4d        2. Arrest of dilation          POSTOPERATIVE DIAGNOSES:  Same      PROCEDURE:     1.  Primary low transverse  section. SURGEON:     Sumit Kay    ASSISTANT:    Primary      ESTIMATED BLOOD LOSS:   None 183 cc      COMPLICATIONS:     None. ANESTHESIA:    Epidural         FINDINGS:   Live female      Normal tubes and ovaries bilaterally. DETAILS OF PROCEDURE: After informed consent was obtained, patient was brought to the operating room. She was then placed in the supine position slightly tilted to the left. Vagina was prepped with Betadine and a fetal pillow was placed. Abdomen was prepped and draped in the usual manner. Anesthesia level was checked and was found to be adequate. The abdomen was entered thru a pfannestiel skin incision, and carried down sharply to the fascia. The fascia was entered in the same plane as the skin incision and  from the underlying rectus abdominis muscles which were  in the midline exposing the parietal peritoneum. The peritoneum was opened sharply in a longitudinal fashion. A bladder retractor was placed. The lower uterine segment was opened in a low transverse fashion. The amniotic cavity was entered and Clear amniotic fluid was suctioned out. The baby was then delivered from the Cephalic . Cord was clamped and cut and the baby was handed over to the nursery nurse. Cord blood was saved. The placenta was then removed manually and the endometrial cavity was swept clean by a wet lap. The uterine incision was closed using a continuous locked suture of 0 vicryl. A second imbricated layer was placed. Tubes and ovaries were inspected and appeared to be normal. . The gutters were cleared of any blood clots and debris. The operative field appeared to be hemostatic.  Fascia closed with 0 vicryl, subcutaneous tissues re-approximated with interrupted 3.0 vicryl, and skin closed with 3-0 monocryl subcuticular stitch. Steristrips were applied followed by a sterile dressing and the patient was then transferred to the recovery room in good stable condition. All sponge needle and instrument counts found to be correct.       Dony Israel MD

## 2022-07-31 NOTE — PROGRESS NOTES
Department of Obstetrics and Gynecology  Labor and Delivery   Midwifery Progress Note      SUBJECTIVE:  Esequiel Taylor is laying comfortably in bed. CNM to room for FHR decleration at 1138. Advised pt of need for repeat SVE and pt agreeable. OBJECTIVE:      Fetal heart rate:       Baseline Heart Rate:  125        Accelerations:  absent       Variability:  moderate       Decelerations:  variable         Contraction frequency: 1-3 minutes    Membranes:  Ruptured clear fluid    Cervix:         Dilation:  8 cm         Effacement:  100%         Station:  -1 to 0 with ctx         Position:  anterior             ASSESSMENT     39 yo  at 38.4  Cat II FHR tracing  Labor progressing slowly; some concern regarding possibility of   PLAN:    IUPC placed. Will start amnioinfusion. Pitocin shut off. Dr. Fitzpatrick Bis updated and agrees with POC. Will continue to monitor closely.      MALIHA Shelby - DARRIN

## 2022-08-01 LAB
HCT VFR BLD CALC: 23.6 % (ref 37–47)
HEMOGLOBIN: 7 GM/DL (ref 12.5–16)
MCH RBC QN AUTO: 20.6 PG (ref 27–31)
MCHC RBC AUTO-ENTMCNC: 29.7 % (ref 32–36)
MCV RBC AUTO: 69.6 FL (ref 78–100)
PDW BLD-RTO: 19.3 % (ref 11.7–14.9)
PLATELET # BLD: 207 K/CU MM (ref 140–440)
PMV BLD AUTO: 9.4 FL (ref 7.5–11.1)
RBC # BLD: 3.39 M/CU MM (ref 4.2–5.4)
WBC # BLD: 12.4 K/CU MM (ref 4–10.5)

## 2022-08-01 PROCEDURE — 1220000000 HC SEMI PRIVATE OB R&B

## 2022-08-01 PROCEDURE — 6360000002 HC RX W HCPCS: Performed by: ADVANCED PRACTICE MIDWIFE

## 2022-08-01 PROCEDURE — 6360000002 HC RX W HCPCS: Performed by: OBSTETRICS & GYNECOLOGY

## 2022-08-01 PROCEDURE — 51702 INSERT TEMP BLADDER CATH: CPT

## 2022-08-01 PROCEDURE — 2580000003 HC RX 258: Performed by: ADVANCED PRACTICE MIDWIFE

## 2022-08-01 PROCEDURE — 6370000000 HC RX 637 (ALT 250 FOR IP): Performed by: OBSTETRICS & GYNECOLOGY

## 2022-08-01 PROCEDURE — 36415 COLL VENOUS BLD VENIPUNCTURE: CPT

## 2022-08-01 PROCEDURE — 85027 COMPLETE CBC AUTOMATED: CPT

## 2022-08-01 PROCEDURE — 2580000003 HC RX 258: Performed by: OBSTETRICS & GYNECOLOGY

## 2022-08-01 RX ADMIN — KETOROLAC TROMETHAMINE 30 MG: 30 INJECTION, SOLUTION INTRAMUSCULAR at 01:54

## 2022-08-01 RX ADMIN — CEFAZOLIN 2000 MG: 2 INJECTION, POWDER, FOR SOLUTION INTRAMUSCULAR; INTRAVENOUS at 09:31

## 2022-08-01 RX ADMIN — Medication 10 ML: at 20:45

## 2022-08-01 RX ADMIN — ACETAMINOPHEN 1000 MG: 500 TABLET ORAL at 05:37

## 2022-08-01 RX ADMIN — KETOROLAC TROMETHAMINE 30 MG: 30 INJECTION, SOLUTION INTRAMUSCULAR at 09:32

## 2022-08-01 RX ADMIN — LEVOTHYROXINE SODIUM 100 MCG: 0.1 TABLET ORAL at 05:37

## 2022-08-01 RX ADMIN — DOCUSATE SODIUM 100 MG: 100 CAPSULE, LIQUID FILLED ORAL at 09:34

## 2022-08-01 RX ADMIN — ACETAMINOPHEN 1000 MG: 500 TABLET ORAL at 12:15

## 2022-08-01 RX ADMIN — DIPHENHYDRAMINE HYDROCHLORIDE 25 MG: 50 INJECTION, SOLUTION INTRAMUSCULAR; INTRAVENOUS at 04:00

## 2022-08-01 RX ADMIN — ONDANSETRON 4 MG: 2 INJECTION INTRAMUSCULAR; INTRAVENOUS at 21:54

## 2022-08-01 RX ADMIN — CEFAZOLIN 2000 MG: 2 INJECTION, POWDER, FOR SOLUTION INTRAMUSCULAR; INTRAVENOUS at 01:57

## 2022-08-01 RX ADMIN — DIPHENHYDRAMINE HYDROCHLORIDE 25 MG: 50 INJECTION, SOLUTION INTRAMUSCULAR; INTRAVENOUS at 20:44

## 2022-08-01 RX ADMIN — IRON SUCROSE 400 MG: 20 INJECTION, SOLUTION INTRAVENOUS at 15:44

## 2022-08-01 RX ADMIN — SODIUM CHLORIDE, PRESERVATIVE FREE 10 ML: 5 INJECTION INTRAVENOUS at 15:41

## 2022-08-01 RX ADMIN — ENOXAPARIN SODIUM 40 MG: 100 INJECTION SUBCUTANEOUS at 05:38

## 2022-08-01 RX ADMIN — Medication 1 TABLET: at 09:34

## 2022-08-01 RX ADMIN — SODIUM CHLORIDE, POTASSIUM CHLORIDE, SODIUM LACTATE AND CALCIUM CHLORIDE: 600; 310; 30; 20 INJECTION, SOLUTION INTRAVENOUS at 04:00

## 2022-08-01 ASSESSMENT — PAIN DESCRIPTION - FREQUENCY
FREQUENCY: INTERMITTENT

## 2022-08-01 ASSESSMENT — PAIN SCALES - GENERAL
PAINLEVEL_OUTOF10: 3
PAINLEVEL_OUTOF10: 1
PAINLEVEL_OUTOF10: 0
PAINLEVEL_OUTOF10: 1
PAINLEVEL_OUTOF10: 2
PAINLEVEL_OUTOF10: 0
PAINLEVEL_OUTOF10: 3

## 2022-08-01 ASSESSMENT — PAIN DESCRIPTION - ORIENTATION
ORIENTATION: LOWER
ORIENTATION: LOWER;MID
ORIENTATION: LOWER
ORIENTATION: LOWER

## 2022-08-01 ASSESSMENT — PAIN DESCRIPTION - DESCRIPTORS
DESCRIPTORS: SORE;ACHING
DESCRIPTORS: SORE;ACHING
DESCRIPTORS: SORE
DESCRIPTORS: ACHING;DISCOMFORT;CRAMPING

## 2022-08-01 ASSESSMENT — PAIN DESCRIPTION - ONSET
ONSET: ON-GOING

## 2022-08-01 ASSESSMENT — PAIN DESCRIPTION - LOCATION
LOCATION: ABDOMEN

## 2022-08-01 ASSESSMENT — PAIN DESCRIPTION - PAIN TYPE
TYPE: ACUTE PAIN;SURGICAL PAIN

## 2022-08-01 ASSESSMENT — PAIN - FUNCTIONAL ASSESSMENT
PAIN_FUNCTIONAL_ASSESSMENT: ACTIVITIES ARE NOT PREVENTED

## 2022-08-01 NOTE — LACTATION NOTE
Visited. Mom is breast feeding at present. She denies need for my assistance. Mom is encouraged to call with questions or if assistance is desired.  Megan Schmitt

## 2022-08-01 NOTE — FLOWSHEET NOTE
Pt noted to be sitting on couch and visiting with family, pt reports a \"pulling sensation\" at times when she was walking. This RN provided reassurance, and educated on splinting at incisional site with position changes or walking to help with discomfort. Offered abdominal binder, however patient declines at this time. Infant being held by grandmother. Infant warm, pink and dry with no apparent distress noted. No further needs identified.

## 2022-08-01 NOTE — FLOWSHEET NOTE
Towels, wash cloths, dana pads, mesh underwear provided to patient. Pt eating breakfast and then is going to get up to bathroom. This RN encouraged patient to call this RN for help up to bathroom. Pt voiced understanding. Infant in crib at bedside, warm pink and dry.

## 2022-08-01 NOTE — PLAN OF CARE
Problem: Pain  Goal: Verbalizes/displays adequate comfort level or baseline comfort level  Outcome: Progressing     Problem: Vaginal Birth or  Section  Goal: Fetal and maternal status remain reassuring during the birth process  Description:  Birth OB-Pregnancy care plan goal which identifies if the fetal and maternal status remain reassuring during the birth process  Outcome: Progressing     Problem: Infection - Adult  Goal: Absence of infection at discharge  Outcome: Progressing  Goal: Absence of infection during hospitalization  Outcome: Progressing  Goal: Absence of fever/infection during anticipated neutropenic period  Outcome: Progressing     Problem: Safety - Adult  Goal: Free from fall injury  Outcome: Progressing     Problem: Discharge Planning  Goal: Discharge to home or other facility with appropriate resources  Outcome: Progressing     Problem: Chronic Conditions and Co-morbidities  Goal: Patient's chronic conditions and co-morbidity symptoms are monitored and maintained or improved  Outcome: Progressing     Problem: ABCDS Injury Assessment  Goal: Absence of physical injury  Outcome: Progressing

## 2022-08-01 NOTE — FLOWSHEET NOTE
This RN at bedside for assessment. Pt requesting to wait to have lopez catheter removed until her boyfriend returns. This RN voiced importance of lopez catheter being removed, and for patient to be up and moving around. Pt voiced understanding. LTI dressing removed, and incision open to air. Steri strips noted in place. No odor or s/sx of infection noted. Adry pad and bed pad changed, and patient repositioned in bed for comfort.

## 2022-08-01 NOTE — FLOWSHEET NOTE
Pt up to bathroom independently, sponge bath at sink in bathroom. Mesh underwear and dana pad provided to patient. New gown provided to patient. Complete bed linen change performed. Pt was unable to void when she got up to bathroom. Canales catheter removed earlier today. This RN encouraged to attempt again after drinking and eating lunch, and to notify staff is she continues to be unable to void.

## 2022-08-01 NOTE — LACTATION NOTE
This note was copied from a baby's chart. Visiting. Mom is initiating breast feeding. Baby is latched and suckling steady. Teaching reviewed: feeding POC, feeding cues, feeding log, breast and nipple care, expected output, weight loss/gain, and STS. I assist Mom with burping and moving baby to the left breast. Baby latches with some assistance  to get a deep latch and then she nurses steady. Mom remains holding baby. She is encouraged to call me PRN. Cont assistance planned.    Ashley Valenzuela

## 2022-08-01 NOTE — FLOWSHEET NOTE
Tami Rosales at patient's bedside to discuss plan of care. Pt still has not voided since lopez removal around 10am. Encouraged to try to void again, if unsuccessful, will need to straight cath. This plan has been discussed with patient and Tami Rosales.

## 2022-08-01 NOTE — PROGRESS NOTES
Department of Obstetrics and Gynecology  Labor and Delivery   Post Partum Progress Note      SUBJECTIVE:  Doing well with no complaints. Ambulating throughout room without dizziness or other s/s of anemia. She did have some dizziness yesterday. Reports bleeding is decreasing and pain is well controlled with medication. Denies pain or discharge at incisional site. Has not voided since lopez removal at 1000. Has not had BM or passed gas. Eating and drinking well. Denies HA/visual changes/epigastric pain. Reports good social support. Denies emotional concerns at this time. OBJECTIVE:      Vitals:  /71   Pulse 90   Temp 97.9 °F (36.6 °C) (Oral)   Resp 16   Ht 5' 3\" (1.6 m)   Wt 162 lb (73.5 kg)   SpO2 98%   Breastfeeding Unknown   BMI 28.70 kg/m²   Lab Results   Component Value Date    WBC 12.4 (H) 08/01/2022    HGB 7.0 (L) 08/01/2022    HCT 23.6 (L) 08/01/2022    MCV 69.6 (L) 08/01/2022     08/01/2022       CONSTITUTIONAL: generally well-appearing. ABDOMEN:  Soft, well contracted uterus. Fundus firm at u-1. C/s incision c/d/i. No erythema or discharge noted. BS present x 4 quadrants. LOCHIA: Normal per pt  LUNGS: CTAB  HEART: RRR,   EXTREMITIES: No calf tenderness or erythema, trace swelling bilaterally       ASSESSMENT:      POD # 1  S/p C/s  GBS negative  RH +  Anemia (hgb 7)  Has not voided  - Flatus    PLAN:     Discussed importance of passing urine in the next couple of hours; pt will hydrate and knows we will need to straight cath her if she doesn't urinate in the next couple of hours. Encouraged ambulation to help pass flatus. Will have RN give simethicone. Will order venofer 400mg for anemia  Encouraged rest/hydration/ambulation/PO intake.        Julieta Lanes, APRN - CNM

## 2022-08-01 NOTE — FLOWSHEET NOTE
Telephone Clint Pacheco CNM to discuss hgb of 7 from 9.6. Pt does not appear symptomatic at this time, however has not been out of bed for this RN at this time. Pt's bleeding has not been heavy. VSS. This RN also reports concern for possible abdominal bloating/distention, however patient is not reporting any abdominal tenderness at this time. Bowel sounds present but no reports of passing gas or BM.

## 2022-08-02 LAB
BASOPHILS ABSOLUTE: 0 K/CU MM
BASOPHILS RELATIVE PERCENT: 0.2 % (ref 0–1)
DIFFERENTIAL TYPE: ABNORMAL
EOSINOPHILS ABSOLUTE: 0 K/CU MM
EOSINOPHILS RELATIVE PERCENT: 0.2 % (ref 0–3)
HCT VFR BLD CALC: 25 % (ref 37–47)
HEMOGLOBIN: 7.4 GM/DL (ref 12.5–16)
IMMATURE NEUTROPHIL %: 1.5 % (ref 0–0.43)
LYMPHOCYTES ABSOLUTE: 1.6 K/CU MM
LYMPHOCYTES RELATIVE PERCENT: 12.3 % (ref 24–44)
MCH RBC QN AUTO: 20.7 PG (ref 27–31)
MCHC RBC AUTO-ENTMCNC: 29.6 % (ref 32–36)
MCV RBC AUTO: 70 FL (ref 78–100)
MONOCYTES ABSOLUTE: 0.5 K/CU MM
MONOCYTES RELATIVE PERCENT: 3.9 % (ref 0–4)
NUCLEATED RBC %: 0.2 %
PDW BLD-RTO: 19.8 % (ref 11.7–14.9)
PLATELET # BLD: 286 K/CU MM (ref 140–440)
PMV BLD AUTO: 9.5 FL (ref 7.5–11.1)
RBC # BLD: 3.57 M/CU MM (ref 4.2–5.4)
SEGMENTED NEUTROPHILS ABSOLUTE COUNT: 10.6 K/CU MM
SEGMENTED NEUTROPHILS RELATIVE PERCENT: 81.9 % (ref 36–66)
TOTAL IMMATURE NEUTOROPHIL: 0.19 K/CU MM
TOTAL NUCLEATED RBC: 0 K/CU MM
WBC # BLD: 12.9 K/CU MM (ref 4–10.5)

## 2022-08-02 PROCEDURE — 6370000000 HC RX 637 (ALT 250 FOR IP)

## 2022-08-02 PROCEDURE — 1220000000 HC SEMI PRIVATE OB R&B

## 2022-08-02 PROCEDURE — 36415 COLL VENOUS BLD VENIPUNCTURE: CPT

## 2022-08-02 PROCEDURE — 2580000003 HC RX 258: Performed by: OBSTETRICS & GYNECOLOGY

## 2022-08-02 PROCEDURE — 6370000000 HC RX 637 (ALT 250 FOR IP): Performed by: OBSTETRICS & GYNECOLOGY

## 2022-08-02 PROCEDURE — 6360000002 HC RX W HCPCS: Performed by: OBSTETRICS & GYNECOLOGY

## 2022-08-02 PROCEDURE — 85025 COMPLETE CBC W/AUTO DIFF WBC: CPT

## 2022-08-02 RX ORDER — ACETAMINOPHEN 160 MG/5ML
1000 SOLUTION ORAL EVERY 8 HOURS PRN
Status: DISCONTINUED | OUTPATIENT
Start: 2022-08-02 | End: 2022-08-03 | Stop reason: HOSPADM

## 2022-08-02 RX ORDER — ACETAMINOPHEN 160 MG/5ML
1000 SOLUTION ORAL EVERY 8 HOURS
Status: DISCONTINUED | OUTPATIENT
Start: 2022-08-02 | End: 2022-08-02

## 2022-08-02 RX ORDER — ACETAMINOPHEN 160 MG/5ML
1000 SOLUTION ORAL 3 TIMES DAILY
Status: DISCONTINUED | OUTPATIENT
Start: 2022-08-02 | End: 2022-08-02

## 2022-08-02 RX ADMIN — IBUPROFEN 800 MG: 100 SUSPENSION ORAL at 09:12

## 2022-08-02 RX ADMIN — ENOXAPARIN SODIUM 40 MG: 100 INJECTION SUBCUTANEOUS at 05:48

## 2022-08-02 RX ADMIN — LEVOTHYROXINE SODIUM 100 MCG: 0.1 TABLET ORAL at 05:48

## 2022-08-02 RX ADMIN — Medication 5 ML: at 09:00

## 2022-08-02 RX ADMIN — ACETAMINOPHEN 1000 MG: 650 SOLUTION ORAL at 13:30

## 2022-08-02 RX ADMIN — IBUPROFEN 800 MG: 100 SUSPENSION ORAL at 17:17

## 2022-08-02 ASSESSMENT — PAIN SCALES - GENERAL
PAINLEVEL_OUTOF10: 1
PAINLEVEL_OUTOF10: 0
PAINLEVEL_OUTOF10: 1
PAINLEVEL_OUTOF10: 0

## 2022-08-02 NOTE — PLAN OF CARE
Problem: Pain  Goal: Verbalizes/displays adequate comfort level or baseline comfort level  2022 1523 by Johnn Bloch, RN  Outcome: Progressing  2022 0502 by Cindy Stoddard RN  Outcome: Progressing  Flowsheets  Taken 2022 2146 by Cindy Stoddard RN  Verbalizes/displays adequate comfort level or baseline comfort level: Encourage patient to monitor pain and request assistance  Taken 2022 2044 by Cindy Stoddard RN  Verbalizes/displays adequate comfort level or baseline comfort level: Encourage patient to monitor pain and request assistance  Taken 2022 1529 by Sharan Rasheed.  Juan Matthews RN  Verbalizes/displays adequate comfort level or baseline comfort level:   Assess pain using appropriate pain scale   Encourage patient to monitor pain and request assistance   Administer analgesics based on type and severity of pain and evaluate response   Implement non-pharmacological measures as appropriate and evaluate response   Consider cultural and social influences on pain and pain management   Notify Licensed Independent Practitioner if interventions unsuccessful or patient reports new pain     Problem: Vaginal Birth or  Section  Goal: Fetal and maternal status remain reassuring during the birth process  Description:  Birth OB-Pregnancy care plan goal which identifies if the fetal and maternal status remain reassuring during the birth process  2022 1523 by Johnn Bloch, RN  Outcome: Progressing  2022 0502 by Cindy Stoddard RN  Outcome: Progressing     Problem: Infection - Adult  Goal: Absence of infection at discharge  2022 1523 by Johnn Bloch, RN  Outcome: Progressing  2022 0502 by Cindy Stoddard RN  Outcome: Progressing  Goal: Absence of infection during hospitalization  2022 1523 by Johnn Bloch, RN  Outcome: Progressing  2022 0502 by Cindy Stoddard RN  Outcome: Progressing  Goal: Absence of fever/infection during anticipated neutropenic period  2022 1523 by Benjamin Reno Perri Ceballos RN  Outcome: Progressing  8/2/2022 0502 by Jackson Kumar RN  Outcome: Progressing     Problem: Safety - Adult  Goal: Free from fall injury  8/2/2022 1523 by Dora Sosa RN  Outcome: Progressing  8/2/2022 0502 by Jackson Kumar RN  Outcome: Progressing     Problem: Discharge Planning  Goal: Discharge to home or other facility with appropriate resources  8/2/2022 1523 by Dora Sosa RN  Outcome: Progressing  8/2/2022 0502 by Jackson Kumar RN  Outcome: Progressing     Problem: Chronic Conditions and Co-morbidities  Goal: Patient's chronic conditions and co-morbidity symptoms are monitored and maintained or improved  8/2/2022 1523 by Dora Sosa RN  Outcome: Progressing  8/2/2022 0502 by Jackson Kumar RN  Outcome: Progressing     Problem: ABCDS Injury Assessment  Goal: Absence of physical injury  8/2/2022 1523 by Dora Sosa RN  Outcome: Progressing  8/2/2022 0502 by Jackson Kumar RN  Outcome: Progressing

## 2022-08-02 NOTE — PLAN OF CARE
Problem: Pain  Goal: Verbalizes/displays adequate comfort level or baseline comfort level  Outcome: Progressing  Flowsheets  Taken 2022 2146 by Kendy Orellana RN  Verbalizes/displays adequate comfort level or baseline comfort level: Encourage patient to monitor pain and request assistance  Taken 2022 2044 by Kendy Orellana RN  Verbalizes/displays adequate comfort level or baseline comfort level: Encourage patient to monitor pain and request assistance  Taken 2022 1529 by Kate Nieto.  Marcia Bates RN  Verbalizes/displays adequate comfort level or baseline comfort level:   Assess pain using appropriate pain scale   Encourage patient to monitor pain and request assistance   Administer analgesics based on type and severity of pain and evaluate response   Implement non-pharmacological measures as appropriate and evaluate response   Consider cultural and social influences on pain and pain management   Notify Licensed Independent Practitioner if interventions unsuccessful or patient reports new pain     Problem: Vaginal Birth or  Section  Goal: Fetal and maternal status remain reassuring during the birth process  Description:  Birth OB-Pregnancy care plan goal which identifies if the fetal and maternal status remain reassuring during the birth process  Outcome: Progressing     Problem: Infection - Adult  Goal: Absence of infection at discharge  Outcome: Progressing  Goal: Absence of infection during hospitalization  Outcome: Progressing  Goal: Absence of fever/infection during anticipated neutropenic period  Outcome: Progressing     Problem: Safety - Adult  Goal: Free from fall injury  Outcome: Progressing     Problem: Discharge Planning  Goal: Discharge to home or other facility with appropriate resources  Outcome: Progressing     Problem: Chronic Conditions and Co-morbidities  Goal: Patient's chronic conditions and co-morbidity symptoms are monitored and maintained or improved  Outcome: Progressing Problem: ABCDS Injury Assessment  Goal: Absence of physical injury  Outcome: Progressing

## 2022-08-02 NOTE — PROGRESS NOTES
Department of Obstetrics and Gynecology  Labor and Delivery   Post Partum Progress Note      SUBJECTIVE:  Doing well with no complaints. Ambulating throughout room without dizziness or other s/s of anemia. Reports bleeding is decreasing and pain is well controlled with medication. Denies pain or discharge at incisional site. Has voided without difficulty. Has not had BM, + flatus. Eating and drinking well. Denies HA/visual changes/epigastric pain. Breastfeeding is going well. Reports good social support. Denies emotional concerns at this time. OBJECTIVE:      Vitals:  /72   Pulse 82   Temp 98.6 °F (37 °C) (Oral)   Resp 18   Ht 5' 3\" (1.6 m)   Wt 162 lb (73.5 kg)   SpO2 96%   Breastfeeding Unknown   BMI 28.70 kg/m²   Lab Results   Component Value Date    WBC 12.4 (H) 08/01/2022    HGB 7.0 (L) 08/01/2022    HCT 23.6 (L) 08/01/2022    MCV 69.6 (L) 08/01/2022     08/01/2022       CONSTITUTIONAL: generally well-appearing. ABDOMEN:  Soft, well contracted uterus. Fundus firm at u-1. C/s incision c/d/i. No erythema or discharge noted. BS present x 4 quadrants. LOCHIA: Normal per pt  LUNGS: CTAB  HEART: RRR,   EXTREMITIES: No calf tenderness, erythema or swelling bilaterally       ASSESSMENT:      POD # 2  S/p C/s  Breastfeeding well  Anemia      PLAN:     Routine PP C/S orders. Repeat CBC ordered. Continue to monitor.         MALIHA Montejo CNM

## 2022-08-03 VITALS
DIASTOLIC BLOOD PRESSURE: 79 MMHG | HEART RATE: 79 BPM | RESPIRATION RATE: 18 BRPM | WEIGHT: 162 LBS | HEIGHT: 63 IN | BODY MASS INDEX: 28.7 KG/M2 | OXYGEN SATURATION: 98 % | SYSTOLIC BLOOD PRESSURE: 134 MMHG | TEMPERATURE: 98.1 F

## 2022-08-03 PROCEDURE — 6370000000 HC RX 637 (ALT 250 FOR IP): Performed by: OBSTETRICS & GYNECOLOGY

## 2022-08-03 PROCEDURE — 6370000000 HC RX 637 (ALT 250 FOR IP): Performed by: ADVANCED PRACTICE MIDWIFE

## 2022-08-03 PROCEDURE — 6360000002 HC RX W HCPCS: Performed by: OBSTETRICS & GYNECOLOGY

## 2022-08-03 RX ORDER — FAMOTIDINE 40 MG/1
40 TABLET, FILM COATED ORAL DAILY
Qty: 30 TABLET | Refills: 3 | Status: SHIPPED | OUTPATIENT
Start: 2022-08-03

## 2022-08-03 RX ORDER — LANOLIN ALCOHOL/MO/W.PET/CERES
25 CREAM (GRAM) TOPICAL DAILY
Status: COMPLETED | OUTPATIENT
Start: 2022-08-03 | End: 2022-08-03

## 2022-08-03 RX ORDER — IBUPROFEN 800 MG/1
800 TABLET ORAL EVERY 8 HOURS PRN
Qty: 120 TABLET | Refills: 3 | Status: SHIPPED | OUTPATIENT
Start: 2022-08-03

## 2022-08-03 RX ORDER — FAMOTIDINE 20 MG/1
40 TABLET, FILM COATED ORAL DAILY
Status: DISCONTINUED | OUTPATIENT
Start: 2022-08-03 | End: 2022-08-03 | Stop reason: HOSPADM

## 2022-08-03 RX ORDER — OXYCODONE HYDROCHLORIDE 5 MG/1
5 TABLET ORAL EVERY 4 HOURS PRN
Qty: 15 TABLET | Refills: 0 | Status: SHIPPED | OUTPATIENT
Start: 2022-08-03 | End: 2022-08-06

## 2022-08-03 RX ORDER — PSEUDOEPHEDRINE HCL 30 MG
100 TABLET ORAL 2 TIMES DAILY
Qty: 60 CAPSULE | Refills: 0 | Status: SHIPPED | OUTPATIENT
Start: 2022-08-03

## 2022-08-03 RX ADMIN — ENOXAPARIN SODIUM 40 MG: 100 INJECTION SUBCUTANEOUS at 05:13

## 2022-08-03 RX ADMIN — PYRIDOXINE HCL TAB 50 MG 25 MG: 50 TAB at 13:55

## 2022-08-03 RX ADMIN — IBUPROFEN 800 MG: 100 SUSPENSION ORAL at 08:26

## 2022-08-03 RX ADMIN — LEVOTHYROXINE SODIUM 100 MCG: 0.1 TABLET ORAL at 05:13

## 2022-08-03 RX ADMIN — DOCUSATE SODIUM 100 MG: 100 CAPSULE, LIQUID FILLED ORAL at 08:26

## 2022-08-03 RX ADMIN — FAMOTIDINE 20 MG: 20 TABLET ORAL at 13:03

## 2022-08-03 ASSESSMENT — PAIN DESCRIPTION - DESCRIPTORS: DESCRIPTORS: DISCOMFORT;SORE

## 2022-08-03 ASSESSMENT — PAIN DESCRIPTION - LOCATION: LOCATION: ABDOMEN

## 2022-08-03 ASSESSMENT — PAIN SCALES - GENERAL: PAINLEVEL_OUTOF10: 4

## 2022-08-03 NOTE — PROGRESS NOTES
Department of Obstetrics and Gynecology  Labor and Delivery   Post Partum Progress Note      SUBJECTIVE:  Doing well but having some issues with nausea and a strong gag reflex. Has not been able to eat solid foods, only liquids. Ambulating throughout room without dizziness or other s/s of anemia. Reports bleeding is decreasing and pain is well controlled with medication. Denies pain or discharge at incisional site. Has voided without difficulty. Has had a small BM, + flatus. Drinking  well. Denies HA/visual changes/epigastric pain. Breastfeeding is going well. Reports good social support. Denies emotional concerns at this time. OBJECTIVE:      Vitals:  /70   Pulse 84   Temp 98 °F (36.7 °C) (Oral)   Resp 18   Ht 5' 3\" (1.6 m)   Wt 162 lb (73.5 kg)   SpO2 98%   Breastfeeding Unknown   BMI 28.70 kg/m²   Lab Results   Component Value Date    WBC 12.9 (H) 08/02/2022    HGB 7.4 (L) 08/02/2022    HCT 25.0 (L) 08/02/2022    MCV 70.0 (L) 08/02/2022     08/02/2022       CONSTITUTIONAL: generally well-appearing. ABDOMEN:  Soft, well contracted uterus. Fundus firm at u-1. C/s incision c/d/i. No erythema or discharge noted. BS present x 4 quadrants. LOCHIA: Normal per pt  LUNGS: CTAB  HEART: RRR,   EXTREMITIES: No calf tenderness, erythema or swelling bilaterally       ASSESSMENT:      POD # 3  S/p C/s  Breastfeeding well  Persistent nausea/gag reflex  GBS neg  RH +    PLAN:     Will plan for discharge today if we can get nausea/gag under control. Will order pepcid and b6 (per pt request) to see if this helps. She reports b6 helped with nausea in the end of her pregnancy. Discharge teaching completed including counseling on warning signs (heavy vaginal bleeding, s/s of preeclampsia, fever >100.4, ACHES, s/s of PPD, s/s of infection at incision site). Rx for colace, ibuprofen and oxycodone. Pt to schedule f/u incision check at 1 week and pp visit at 6-8 weeks in the office.        Harsh Crespo APRN - CNM

## 2022-08-03 NOTE — PLAN OF CARE
Problem: Pain  Goal: Verbalizes/displays adequate comfort level or baseline comfort level  8/3/2022 1005 by Janice Kee RN  Outcome: Completed  2022 by Santos Nation RN  Outcome: Progressing  Flowsheets (Taken 2022 1939)  Verbalizes/displays adequate comfort level or baseline comfort level: Encourage patient to monitor pain and request assistance     Problem: Vaginal Birth or  Section  Goal: Fetal and maternal status remain reassuring during the birth process  Description:  Birth OB-Pregnancy care plan goal which identifies if the fetal and maternal status remain reassuring during the birth process  8/3/2022 1005 by Janice Kee RN  Outcome: Completed  2022 by Santos Nation RN  Outcome: Progressing     Problem: Infection - Adult  Goal: Absence of infection at discharge  8/3/2022 100 by Janice Kee RN  Outcome: Completed  2022 by Santos Nation RN  Outcome: Progressing  Goal: Absence of infection during hospitalization  8/3/2022 1005 by Janice Kee RN  Outcome: Completed  2022 by Santos Nation RN  Outcome: Progressing  Goal: Absence of fever/infection during anticipated neutropenic period  8/3/2022 1005 by Janice Kee RN  Outcome: Completed  2022 by Santos Nation RN  Outcome: Progressing     Problem: Safety - Adult  Goal: Free from fall injury  8/3/2022 1005 by Janice Kee RN  Outcome: Completed  2022 by Santos Nation RN  Outcome: Progressing     Problem: Discharge Planning  Goal: Discharge to home or other facility with appropriate resources  8/3/2022 1005 by Janice Kee RN  Outcome: Completed  2022 by Santos Nation RN  Outcome: Progressing     Problem: Chronic Conditions and Co-morbidities  Goal: Patient's chronic conditions and co-morbidity symptoms are monitored and maintained or improved  8/3/2022 1005 by Janice Kee RN  Outcome: Completed  2022 by Santos Nation RN  Outcome: Progressing     Problem: ABCDS Injury Assessment  Goal: Absence of physical injury  8/3/2022 1005 by Greg Barnes RN  Outcome: Completed  8/2/2022 2153 by Jayme Adame RN  Outcome: Progressing

## 2022-08-03 NOTE — DISCHARGE INSTRUCTIONS
Discharge Instructions    Thank you for letting us care for you and your family. The following are  discharge instructions for yourself and your baby. If you have any questions once you have arrived home please feel free to contact [facility] at [phone]. MOM INSTRUCTIONS    DIET    Eat a well balanced diet focusing on foods high in fiber and protein. Drink plenty of fluids (  8 to 10 glasses a day) especially water. To avoid constipation you may take a mild stool softener as recommended by your doctor or midwife. ACTIVITY    Gradually increase your activity. Resume your exercise regimen only after advised by you doctor or midwife. Avoid lifting anything heavier than your baby for 6 weeks or until otherwise advised by your doctor or midwife. Avoid driving for 2 weeks or if taking narcotics. Climb stairs one at a time. Use caution when carrying your baby up and down the stairs. NO SEXUAL ACTIVITY and nothing in your vagina( tampons or douching) for 4 to 6 weeks or until advised by your doctor or midwife. Be prepared to discuss family planning at your follow-up OB visit. You may feel tired or have a lack of energy. Nap when the baby naps to catch up on your sleep. You may continue to take prenatal vitamin to replenish nutrients post delivery as directed by your doctor or midwife. EMOTIONS    You may feel sad, teary, overwhelmed and pires. Contact your OB provider if symptoms worsen or last more than 2 weeks. Contact your OB provider if you have thoughts of harming yourself or your baby. If your baby will not stop crying and you are feeling overwhelmed, place your baby in a crib on their back and contact another adult for help. NEVER SHAKE A BABY. BLEEDING    Your vaginal bleeding will decrease in amount over the next 2 to 6 weeks. You will notice that as your activity increases your flow may increase.  This is your body's way of telling you to take it easy and rest more.  If you saturate more than one maxi pad in an hour or you pass a clot larger than a lemon and resting does not help the flow slow down , call your OB doctor or midwife. If your bleeding has a foul odor call you doctor or midwife. BREAST CARE    For breastfeeding moms:  Refer to your breastfeeding booklet provided by the hospital if you have any questions. If you become engorged,feeding may be more difficult or painful for 1 to 2 days. You may find it helpful to express some milk before feeding so that the infant can latch on more easily. Continue to take your prenatal vitamins as directed by your doctor or midwife while you are breastfeeding. For any questions or concerns, contact our Lactation Consultant at [phone]. For non-breastfeeding moms:  You may apply ice packs to your breasts over your bra for twenty minutes at a time for comfort. Avoid stimulation to your breasts. When showering allow the water to strike your back not your breasts. Do not express your milk. Wear a good fitting bra. INCISIONAL CARE    Clean your incision in the shower with mild soap. After your shower pat the incision dry and keep the area open to the air. If used, steri-strips should be removed by 2 weeks. If used,staples should be removed by the OB's office in 1 week. If ordered, an abdominal binder may provide support for your incision. Have some one check your incision ever day for swelling,bleeding,drainage,foul odor,redness and that the edges are approximated. If your incision has any of the above,notify you doctor or midwife. PERINEAL CARE    Use the dana-bottle every time after you use the toilet. Cleanse your perineum from front to back. If you had stitches in your perineum,they will dissolve in 4 to 6 weeks. You may use a sitz bath and Tucks pads as directed and as needed for comfort. SWELLING    Try to keep your legs elevated when you are sitting.   When lying down keep your legs elevated. When wearing stockings or socks,make sure they are not too tight. WHEN TO CALL THE DOCTOR    If you have a temperature of 100.6 degrees or higher. If your bleeding has increased and your are soaking a maxi-pad in an hour. If your abdomen is tender to touch. If you are passing blood clots bigger than the size of a lemon. If you are experiencing extreme weakness or dizziness. If you have are having flu-like symptoms such as achy joints and muscles. If there is a foul smell or a green color to your vaginal bleeding. If you have pain that can not be relieved. If you have persistent burning with urination or frequent urination. If you have concerns about your well-being. If you have a warm,firm, red lump in your breast.  If you are unable to sleep,eat, or are having thoughts of harming yourself or the baby. If you have a red,warm, tender area in your calf. If you have swelling, bleeding, drainage,foul odor,redness or warmth in or around your incision or stitches. PAMPHLETS GIVEN TO PARENTS    [employer]   Good Nutrition for Women, Infants and Children  : [employer] [place] to help protect the health and wellness of adults and infants. [address] [facility]: [address] [facility]: [place]  hearing Screening  [place] [facility]: [place], [place] [facility] Postpartum Depression Velia Samuels [address] Department of Health: All kids need Hepatitis B shots!   Back to sleep handout  Shaken baby handout        [address]   [facility] [address]  [phone]      [facility] [address]  [address]  [phone]

## 2022-08-03 NOTE — PROGRESS NOTES
ID bands checked. Infant's ID band removed and stapled to footprint sheet, the mother verified as correct, signed and witnessed by RN. Hugs tag removed. Mother of baby signed Safe Baby Crib Form verifying that she does have a safe crib for baby at home. Discharge instructions given and reviewed. Patient voiced understanding. Rx's for Ibuprofen and Oxycodone reviewed and sent to [facility]. Filled and brought to patient by Meds to Bed. Rx Pepcid written and paper rx given to Patient to take to pharmacy. Written and verbal education provided about opiate side effects and possibility of addiction. Patient voiced understanding. Patient is ambulating well at discharge with pain #0. Pt's boyfriend is driving patient and baby home. Mother verbalizes understanding to follow-up with Pediatric Provider at [facility] in 2-3 days, and OB Provider Dr. Noreen Nielson in 1 week for incision check and in 6 weeks for postpartum as instructed. Baby pink, harnessed into carseat at discharge by parents. Mother and baby escorted to hospital exit by nurse in wheelchair.

## 2022-08-03 NOTE — DISCHARGE SUMMARY
Obstetrical Discharge Form    Gestational Age:  38w3d    Antepartum complications: none    Date of Delivery:   /      Type of Delivery:    for failure to progress    Delivered By:                 Baby:       Information for the patient's :  Lawrence Euceda [[id number]]      Anesthesia:    Spinal    Intrapartum complications: Failure to Progress    Feeding method:   breast    Postpartum complications: anemia and persistent nausea/gag reflex    Discharge Date:  08/3/22- if we can get nausea under control with medication     Condition of discharge:  good    Plan:   Follow up    1 wk for incision check and 6 wk for pp visit.      MALIHA Wiley CNM

## 2022-08-03 NOTE — PLAN OF CARE
Problem: Pain  Goal: Verbalizes/displays adequate comfort level or baseline comfort level  2022 by Elise Villanueva RN  Outcome: Progressing  Flowsheets (Taken 2022 1939)  Verbalizes/displays adequate comfort level or baseline comfort level: Encourage patient to monitor pain and request assistance  2022 1523 by Katey Lima RN  Outcome: Progressing     Problem: Vaginal Birth or  Section  Goal: Fetal and maternal status remain reassuring during the birth process  Description:  Birth OB-Pregnancy care plan goal which identifies if the fetal and maternal status remain reassuring during the birth process  2022 by Elise Villanueva RN  Outcome: Progressing  2022 1523 by Katey Lima RN  Outcome: Progressing     Problem: Infection - Adult  Goal: Absence of infection at discharge  2022 by Elise Villanueva RN  Outcome: Progressing  2022 1523 by Katey Lima RN  Outcome: Progressing  Goal: Absence of infection during hospitalization  2022 by Elise Villanueva RN  Outcome: Progressing  2022 1523 by Katey Lima RN  Outcome: Progressing  Goal: Absence of fever/infection during anticipated neutropenic period  2022 by Elise Villanueva RN  Outcome: Progressing  2022 1523 by Katey Lima RN  Outcome: Progressing     Problem: Safety - Adult  Goal: Free from fall injury  2022 by Elise Villanueva RN  Outcome: Progressing  2022 1523 by Katey Lima RN  Outcome: Progressing     Problem: Discharge Planning  Goal: Discharge to home or other facility with appropriate resources  2022 by Elise Villanueva RN  Outcome: Progressing  2022 1523 by Katey Lima RN  Outcome: Progressing     Problem: Chronic Conditions and Co-morbidities  Goal: Patient's chronic conditions and co-morbidity symptoms are monitored and maintained or improved  2022 by Elise Villanueva RN  Outcome: Progressing  2022 1523 by Katey Lima

## 2022-12-01 ENCOUNTER — TELEPHONE (OUTPATIENT)
Dept: GASTROENTEROLOGY | Age: 37
End: 2022-12-01

## 2022-12-02 RX ORDER — FAMOTIDINE 10 MG
10 TABLET ORAL 2 TIMES DAILY PRN
COMMUNITY

## 2022-12-02 NOTE — PROGRESS NOTES
.Surgery @ [facility] on 12/9/22 you will be called 12/8/22 with times               1. Do not eat or drink anything after midnight - unless instructed by your doctor prior to surgery. This includes                   no water, chewing gum or mints. 2. Follow your directions as prescribed by the doctor for your procedure and medications. Take Synthroid with sips of water. 3. Check with your Doctor regarding stopping vitamins, supplements, blood thinners and follow their instructions. Stop vitamins, supplements and NSAIDS: 12/2/2022   4. Do not smoke, vape or use chewing tobacco morning of surgery. Do not drink any alcoholic beverages 24 hours prior to surgery. This includes NA Beer. No street drugs 7 days prior to surgery. 5. You may brush your teeth and gargle the morning of surgery. DO NOT SWALLOW WATER   6. You MUST make arrangements for a responsible adult to take you home after your surgery and be able to check on you every couple                   hours for the day. You will not be allowed to leave alone or drive yourself home. It is strongly suggested someone stay with you the first 24                   hrs. Your surgery will be cancelled if you do not have a ride home. 7. Please wear simple, loose fitting clothing to the hospital.  Arthea Hippo not bring valuables (money, credit cards, checkbooks, etc.) Do not wear any                   makeup (including no eye makeup) or nail polish on your fingers or toes. 8. DO NOT wear any jewelry or piercings on day of surgery. All body piercing jewelry must be removed. 9. If you have dentures, they will be removed before going to the OR; we will provide you a container. If you wear contact lenses or glasses,                  they will be removed; please bring a case for them. 10. If you  have a Living Will and Durable Power of  for Healthcare, please bring in a copy.            11. Please bring picture ID, insurance card, paperwork from the doctors office    (H & P, Consent, & card for implantable devices). 12. Take a shower the morning of your procedure with Hibiclens or an anti-bacterial soap. Do not apply any make-up, deodorant, lotion, oil or powder. 15.  Enter thru the main entrance on the day of surgery.

## 2022-12-02 NOTE — PROGRESS NOTES
Left second VM with callback number, please call PAT nurse for phone assessment and surgery instructions, physician office notified

## 2022-12-08 ENCOUNTER — TELEPHONE (OUTPATIENT)
Dept: GASTROENTEROLOGY | Age: 37
End: 2022-12-08

## 2022-12-08 ENCOUNTER — ANESTHESIA EVENT (OUTPATIENT)
Dept: OPERATING ROOM | Age: 37
End: 2022-12-08
Payer: COMMERCIAL

## 2022-12-08 NOTE — PROGRESS NOTES
Left VM notifying patient of surgery time at [facility] 12/09/2022 1100 with arrival at 0900

## 2022-12-08 NOTE — ANESTHESIA PRE PROCEDURE
Department of Anesthesiology  Preprocedure Note       Name:  Geremias Herzog   Age:  40 y.o.  :  1985                                          MRN:  [id number]         Date:  2022      Surgeon: Edwin Holt):  Adithya Dong MD    Procedure: Procedure(s):  CHOLECYSTECTOMY LAPAROSCOPIC    Medications prior to admission:   Prior to Admission medications    Medication Sig Start Date End Date Taking? Authorizing Provider   famotidine (PEPCID) 10 MG tablet Take 10 mg by mouth 2 times daily as needed Patient states she only takes this medication occasionally    Historical Provider, MD   ferrous sulfate (FE TABS 325) 325 (65 Fe) MG EC tablet TWO TABLETS TWICE A WEEK 10/31/22   Historical Provider, MD   ibuprofen (ADVIL;MOTRIN) 800 MG tablet Take 1 tablet by mouth every 8 hours as needed for Pain 8/3/22   MALIHA Colindres CNM   levothyroxine (SYNTHROID) 100 MCG tablet Take 100 mcg by mouth in the morning. Historical Provider, MD       Current medications:    Current Outpatient Medications   Medication Sig Dispense Refill    famotidine (PEPCID) 10 MG tablet Take 10 mg by mouth 2 times daily as needed Patient states she only takes this medication occasionally      ferrous sulfate (FE TABS 325) 325 (65 Fe) MG EC tablet TWO TABLETS TWICE A WEEK      ibuprofen (ADVIL;MOTRIN) 800 MG tablet Take 1 tablet by mouth every 8 hours as needed for Pain 120 tablet 3    levothyroxine (SYNTHROID) 100 MCG tablet Take 100 mcg by mouth in the morning. No current facility-administered medications for this visit. Allergies:     Allergies   Allergen Reactions    Latex Hives       Problem List:    Patient Active Problem List   Diagnosis Code    Indication for care in labor or delivery O75.9    Labor and delivery indication for care or intervention O75.9    Labor and delivery, indication for care O75.9       Past Medical History:        Diagnosis Date    Thyroid disease        Past Surgical History: Procedure Laterality Date     SECTION N/A 2022     SECTION performed by Asuncion Mars MD at [address] [facility] L&D OR       Social History:    Social History     Tobacco Use    Smoking status: Never    Smokeless tobacco: Never   Substance Use Topics    Alcohol use: Not Currently                                Counseling given: Not Answered      Vital Signs (Current): There were no vitals filed for this visit. BP Readings from Last 3 Encounters:   22 134/79       NPO Status:                                                                                 BMI:   Wt Readings from Last 3 Encounters:   22 135 lb (61.2 kg)   22 162 lb (73.5 kg)     There is no height or weight on file to calculate BMI.    CBC:   Lab Results   Component Value Date/Time    WBC 12.9 2022 09:46 AM    RBC 3.57 2022 09:46 AM    HGB 7.4 2022 09:46 AM    HCT 25.0 2022 09:46 AM    MCV 70.0 2022 09:46 AM    RDW 19.8 2022 09:46 AM     2022 09:46 AM       CMP: No results found for: NA, K, CL, CO2, BUN, CREATININE, GFRAA, AGRATIO, LABGLOM, GLUCOSE, GLU, PROT, CALCIUM, BILITOT, ALKPHOS, AST, ALT    POC Tests: No results for input(s): POCGLU, POCNA, POCK, POCCL, POCBUN, POCHEMO, POCHCT in the last 72 hours.     Coags: No results found for: PROTIME, INR, APTT    HCG (If Applicable): No results found for: PREGTESTUR, PREGSERUM, HCG, HCGQUANT     ABGs: No results found for: PHART, PO2ART, ACC9VKA, KLV1RRD, BEART, N2HHXNCV     Type & Screen (If Applicable):  No results found for: LABABO, LABRH    Drug/Infectious Status (If Applicable):  No results found for: HIV, HEPCAB    COVID-19 Screening (If Applicable): No results found for: COVID19        Anesthesia Evaluation  Patient summary reviewed  Airway: Mallampati: II  TM distance: >3 FB   Neck ROM: full  Mouth opening: > = 3 FB   Dental: normal exam         Pulmonary:normal exam                               Cardiovascular:  Exercise tolerance: good (>4 METS),           Rhythm: regular  Rate: normal                    Neuro/Psych:               GI/Hepatic/Renal:   (+) GERD:,           Endo/Other:    (+) hypothyroidism::., .                 Abdominal:              PE comment: pregnant   Vascular: Other Findings:             Anesthesia Plan      general     ASA 2     (  Needs urine vs blood  preg test)  Induction: intravenous. MIPS: Postoperative opioids intended. Anesthetic plan and risks discussed with patient. Plan discussed with CRNA.     Attending anesthesiologist reviewed and agrees with Preprocedure content                MALIHA Boland - CRNA   12/8/2022

## 2022-12-09 ENCOUNTER — ANESTHESIA (OUTPATIENT)
Dept: OPERATING ROOM | Age: 37
End: 2022-12-09
Payer: COMMERCIAL

## 2022-12-09 ENCOUNTER — HOSPITAL ENCOUNTER (OUTPATIENT)
Age: 37
Setting detail: OUTPATIENT SURGERY
Discharge: HOME | End: 2022-12-09
Attending: SURGERY | Admitting: SURGERY
Payer: COMMERCIAL

## 2022-12-09 VITALS
HEART RATE: 76 BPM | HEIGHT: 63 IN | WEIGHT: 135 LBS | TEMPERATURE: 97.8 F | RESPIRATION RATE: 16 BRPM | BODY MASS INDEX: 23.92 KG/M2 | SYSTOLIC BLOOD PRESSURE: 132 MMHG | OXYGEN SATURATION: 100 % | DIASTOLIC BLOOD PRESSURE: 76 MMHG

## 2022-12-09 DIAGNOSIS — K80.10 CCC (CHRONIC CALCULOUS CHOLECYSTITIS): ICD-10-CM

## 2022-12-09 PROCEDURE — 7100000011 HC PHASE II RECOVERY - ADDTL 15 MIN: Performed by: SURGERY

## 2022-12-09 PROCEDURE — 7100000010 HC PHASE II RECOVERY - FIRST 15 MIN: Performed by: SURGERY

## 2022-12-09 PROCEDURE — 88304 TISSUE EXAM BY PATHOLOGIST: CPT | Performed by: PATHOLOGY

## 2022-12-09 PROCEDURE — 3700000000 HC ANESTHESIA ATTENDED CARE: Performed by: SURGERY

## 2022-12-09 PROCEDURE — 2500000003 HC RX 250 WO HCPCS

## 2022-12-09 PROCEDURE — 6370000000 HC RX 637 (ALT 250 FOR IP): Performed by: ANESTHESIOLOGY

## 2022-12-09 PROCEDURE — 6360000002 HC RX W HCPCS: Performed by: ANESTHESIOLOGY

## 2022-12-09 PROCEDURE — 2500000003 HC RX 250 WO HCPCS: Performed by: SURGERY

## 2022-12-09 PROCEDURE — 3600000004 HC SURGERY LEVEL 4 BASE: Performed by: SURGERY

## 2022-12-09 PROCEDURE — 6360000002 HC RX W HCPCS

## 2022-12-09 PROCEDURE — 3600000014 HC SURGERY LEVEL 4 ADDTL 15MIN: Performed by: SURGERY

## 2022-12-09 PROCEDURE — 7100000001 HC PACU RECOVERY - ADDTL 15 MIN: Performed by: SURGERY

## 2022-12-09 PROCEDURE — 7100000000 HC PACU RECOVERY - FIRST 15 MIN: Performed by: SURGERY

## 2022-12-09 PROCEDURE — 2580000003 HC RX 258: Performed by: ANESTHESIOLOGY

## 2022-12-09 PROCEDURE — 6360000002 HC RX W HCPCS: Performed by: SURGERY

## 2022-12-09 PROCEDURE — 3700000001 HC ADD 15 MINUTES (ANESTHESIA): Performed by: SURGERY

## 2022-12-09 PROCEDURE — 2580000003 HC RX 258: Performed by: SURGERY

## 2022-12-09 PROCEDURE — 6370000000 HC RX 637 (ALT 250 FOR IP)

## 2022-12-09 PROCEDURE — 2709999900 HC NON-CHARGEABLE SUPPLY: Performed by: SURGERY

## 2022-12-09 RX ORDER — ONDANSETRON 4 MG/1
4 TABLET, ORALLY DISINTEGRATING ORAL EVERY 8 HOURS PRN
Qty: 20 TABLET | Refills: 0 | Status: SHIPPED | OUTPATIENT
Start: 2022-12-09

## 2022-12-09 RX ORDER — ONDANSETRON 2 MG/ML
4 INJECTION INTRAMUSCULAR; INTRAVENOUS
Status: COMPLETED | OUTPATIENT
Start: 2022-12-09 | End: 2022-12-09

## 2022-12-09 RX ORDER — SODIUM CHLORIDE, SODIUM LACTATE, POTASSIUM CHLORIDE, CALCIUM CHLORIDE 600; 310; 30; 20 MG/100ML; MG/100ML; MG/100ML; MG/100ML
INJECTION, SOLUTION INTRAVENOUS CONTINUOUS
Status: DISCONTINUED | OUTPATIENT
Start: 2022-12-09 | End: 2022-12-09 | Stop reason: HOSPADM

## 2022-12-09 RX ORDER — DIPHENHYDRAMINE HYDROCHLORIDE 50 MG/ML
12.5 INJECTION INTRAMUSCULAR; INTRAVENOUS ONCE
Status: COMPLETED | OUTPATIENT
Start: 2022-12-09 | End: 2022-12-09

## 2022-12-09 RX ORDER — HYDRALAZINE HYDROCHLORIDE 20 MG/ML
10 INJECTION INTRAMUSCULAR; INTRAVENOUS
Status: DISCONTINUED | OUTPATIENT
Start: 2022-12-09 | End: 2022-12-09 | Stop reason: HOSPADM

## 2022-12-09 RX ORDER — LABETALOL HYDROCHLORIDE 5 MG/ML
10 INJECTION, SOLUTION INTRAVENOUS
Status: DISCONTINUED | OUTPATIENT
Start: 2022-12-09 | End: 2022-12-09 | Stop reason: HOSPADM

## 2022-12-09 RX ORDER — FENTANYL CITRATE 50 UG/ML
50 INJECTION, SOLUTION INTRAMUSCULAR; INTRAVENOUS EVERY 5 MIN PRN
Status: DISCONTINUED | OUTPATIENT
Start: 2022-12-09 | End: 2022-12-09 | Stop reason: HOSPADM

## 2022-12-09 RX ORDER — ROCURONIUM BROMIDE 10 MG/ML
INJECTION, SOLUTION INTRAVENOUS PRN
Status: DISCONTINUED | OUTPATIENT
Start: 2022-12-09 | End: 2022-12-09 | Stop reason: SDUPTHER

## 2022-12-09 RX ORDER — PROPOFOL 10 MG/ML
INJECTION, EMULSION INTRAVENOUS PRN
Status: DISCONTINUED | OUTPATIENT
Start: 2022-12-09 | End: 2022-12-09 | Stop reason: SDUPTHER

## 2022-12-09 RX ORDER — MIDAZOLAM HYDROCHLORIDE 1 MG/ML
INJECTION INTRAMUSCULAR; INTRAVENOUS PRN
Status: DISCONTINUED | OUTPATIENT
Start: 2022-12-09 | End: 2022-12-09 | Stop reason: SDUPTHER

## 2022-12-09 RX ORDER — SCOLOPAMINE TRANSDERMAL SYSTEM 1 MG/1
1 PATCH, EXTENDED RELEASE TRANSDERMAL ONCE
Status: DISCONTINUED | OUTPATIENT
Start: 2022-12-09 | End: 2022-12-09 | Stop reason: HOSPADM

## 2022-12-09 RX ORDER — ONDANSETRON 4 MG/1
4 TABLET, ORALLY DISINTEGRATING ORAL EVERY 8 HOURS PRN
Qty: 20 TABLET | Refills: 0 | Status: SHIPPED | OUTPATIENT
Start: 2022-12-09 | End: 2022-12-09 | Stop reason: HOSPADM

## 2022-12-09 RX ORDER — DEXAMETHASONE SODIUM PHOSPHATE 4 MG/ML
INJECTION, SOLUTION INTRA-ARTICULAR; INTRALESIONAL; INTRAMUSCULAR; INTRAVENOUS; SOFT TISSUE PRN
Status: DISCONTINUED | OUTPATIENT
Start: 2022-12-09 | End: 2022-12-09 | Stop reason: SDUPTHER

## 2022-12-09 RX ORDER — VALACYCLOVIR HYDROCHLORIDE 1 G/1
1000 TABLET, FILM COATED ORAL 2 TIMES DAILY
COMMUNITY

## 2022-12-09 RX ORDER — SODIUM CHLORIDE 9 MG/ML
500 INJECTION, SOLUTION INTRAVENOUS PRN
Status: DISCONTINUED | OUTPATIENT
Start: 2022-12-09 | End: 2022-12-09 | Stop reason: HOSPADM

## 2022-12-09 RX ORDER — SODIUM CHLORIDE 0.9 % (FLUSH) 0.9 %
5-40 SYRINGE (ML) INJECTION EVERY 12 HOURS SCHEDULED
Status: DISCONTINUED | OUTPATIENT
Start: 2022-12-09 | End: 2022-12-09 | Stop reason: HOSPADM

## 2022-12-09 RX ORDER — FENTANYL CITRATE 50 UG/ML
INJECTION, SOLUTION INTRAMUSCULAR; INTRAVENOUS PRN
Status: DISCONTINUED | OUTPATIENT
Start: 2022-12-09 | End: 2022-12-09 | Stop reason: SDUPTHER

## 2022-12-09 RX ORDER — SODIUM CHLORIDE 0.9 % (FLUSH) 0.9 %
5-40 SYRINGE (ML) INJECTION PRN
Status: DISCONTINUED | OUTPATIENT
Start: 2022-12-09 | End: 2022-12-09 | Stop reason: HOSPADM

## 2022-12-09 RX ORDER — OXYCODONE HYDROCHLORIDE 5 MG/1
10 TABLET ORAL PRN
Status: DISCONTINUED | OUTPATIENT
Start: 2022-12-09 | End: 2022-12-09 | Stop reason: HOSPADM

## 2022-12-09 RX ORDER — OXYCODONE HYDROCHLORIDE 5 MG/1
5 TABLET ORAL PRN
Status: DISCONTINUED | OUTPATIENT
Start: 2022-12-09 | End: 2022-12-09 | Stop reason: HOSPADM

## 2022-12-09 RX ORDER — ONDANSETRON 2 MG/ML
4 INJECTION INTRAMUSCULAR; INTRAVENOUS
Status: DISCONTINUED | OUTPATIENT
Start: 2022-12-09 | End: 2022-12-09 | Stop reason: HOSPADM

## 2022-12-09 RX ORDER — ONDANSETRON 2 MG/ML
INJECTION INTRAMUSCULAR; INTRAVENOUS PRN
Status: DISCONTINUED | OUTPATIENT
Start: 2022-12-09 | End: 2022-12-09 | Stop reason: SDUPTHER

## 2022-12-09 RX ORDER — FENTANYL CITRATE 50 UG/ML
25 INJECTION, SOLUTION INTRAMUSCULAR; INTRAVENOUS EVERY 5 MIN PRN
Status: DISCONTINUED | OUTPATIENT
Start: 2022-12-09 | End: 2022-12-09 | Stop reason: HOSPADM

## 2022-12-09 RX ORDER — BUPIVACAINE HYDROCHLORIDE 5 MG/ML
INJECTION, SOLUTION EPIDURAL; INTRACAUDAL
Status: COMPLETED | OUTPATIENT
Start: 2022-12-09 | End: 2022-12-09

## 2022-12-09 RX ORDER — LIDOCAINE HYDROCHLORIDE 20 MG/ML
INJECTION, SOLUTION INTRAVENOUS PRN
Status: DISCONTINUED | OUTPATIENT
Start: 2022-12-09 | End: 2022-12-09 | Stop reason: SDUPTHER

## 2022-12-09 RX ADMIN — ONDANSETRON 4 MG: 2 INJECTION INTRAMUSCULAR; INTRAVENOUS at 11:34

## 2022-12-09 RX ADMIN — LIDOCAINE HYDROCHLORIDE 100 MG: 20 INJECTION, SOLUTION INTRAVENOUS at 11:12

## 2022-12-09 RX ADMIN — CEFAZOLIN 1000 MG: 1 INJECTION, POWDER, FOR SOLUTION INTRAMUSCULAR; INTRAVENOUS at 11:05

## 2022-12-09 RX ADMIN — FENTANYL CITRATE 50 MCG: 50 INJECTION, SOLUTION INTRAMUSCULAR; INTRAVENOUS at 11:12

## 2022-12-09 RX ADMIN — PROPOFOL 120 MG: 10 INJECTION, EMULSION INTRAVENOUS at 11:12

## 2022-12-09 RX ADMIN — MIDAZOLAM 2 MG: 1 INJECTION INTRAMUSCULAR; INTRAVENOUS at 11:07

## 2022-12-09 RX ADMIN — FENTANYL CITRATE 50 MCG: 50 INJECTION, SOLUTION INTRAMUSCULAR; INTRAVENOUS at 11:26

## 2022-12-09 RX ADMIN — HYDROMORPHONE HYDROCHLORIDE 1 MG: 1 INJECTION, SOLUTION INTRAMUSCULAR; INTRAVENOUS; SUBCUTANEOUS at 11:31

## 2022-12-09 RX ADMIN — DEXAMETHASONE SODIUM PHOSPHATE 8 MG: 4 INJECTION, SOLUTION INTRAMUSCULAR; INTRAVENOUS at 11:34

## 2022-12-09 RX ADMIN — ROCURONIUM BROMIDE 50 MG: 10 INJECTION, SOLUTION INTRAVENOUS at 11:12

## 2022-12-09 RX ADMIN — SODIUM CHLORIDE, POTASSIUM CHLORIDE, SODIUM LACTATE AND CALCIUM CHLORIDE: 600; 310; 30; 20 INJECTION, SOLUTION INTRAVENOUS at 11:05

## 2022-12-09 RX ADMIN — ONDANSETRON 4 MG: 2 INJECTION INTRAMUSCULAR; INTRAVENOUS at 13:15

## 2022-12-09 RX ADMIN — DIPHENHYDRAMINE HYDROCHLORIDE 12.5 MG: 50 INJECTION, SOLUTION INTRAMUSCULAR; INTRAVENOUS at 14:59

## 2022-12-09 RX ADMIN — SODIUM CHLORIDE 500 ML: 9 INJECTION, SOLUTION INTRAVENOUS at 15:00

## 2022-12-09 ASSESSMENT — PAIN DESCRIPTION - PAIN TYPE: TYPE: SURGICAL PAIN

## 2022-12-09 ASSESSMENT — PAIN DESCRIPTION - ORIENTATION: ORIENTATION: RIGHT

## 2022-12-09 ASSESSMENT — PAIN SCALES - GENERAL
PAINLEVEL_OUTOF10: 0
PAINLEVEL_OUTOF10: 5
PAINLEVEL_OUTOF10: 4

## 2022-12-09 ASSESSMENT — PAIN - FUNCTIONAL ASSESSMENT
PAIN_FUNCTIONAL_ASSESSMENT: PREVENTS OR INTERFERES SOME ACTIVE ACTIVITIES AND ADLS
PAIN_FUNCTIONAL_ASSESSMENT: NONE - DENIES PAIN

## 2022-12-09 ASSESSMENT — PAIN DESCRIPTION - FREQUENCY: FREQUENCY: CONTINUOUS

## 2022-12-09 ASSESSMENT — PAIN DESCRIPTION - LOCATION: LOCATION: ABDOMEN

## 2022-12-09 ASSESSMENT — PAIN DESCRIPTION - DESCRIPTORS: DESCRIPTORS: ACHING;DISCOMFORT

## 2022-12-09 NOTE — ANESTHESIA POSTPROCEDURE EVALUATION
Department of Anesthesiology  Postprocedure Note    Patient: Raleigh Doherty  MRN: [id number]  YOB: 1985  Date of evaluation: 12/9/2022      Procedure Summary     Date: 12/09/22 Room / Location: [address]    Anesthesia Start: 1107 Anesthesia Stop: 1212    Procedure: CHOLECYSTECTOMY LAPAROSCOPIC (Abdomen) Diagnosis:       CCC (chronic calculous cholecystitis)      (CCC (chronic calculous cholecystitis) [K80.10])    Surgeons: Dinora Butcher MD Responsible Provider: Toribio Hsieh MD    Anesthesia Type: General ASA Status: 2          Anesthesia Type: General    Harsha Phase I: Harsha Score: 7    Harsha Phase II:        Anesthesia Post Evaluation    Patient location during evaluation: PACU  Patient participation: complete - patient participated  Level of consciousness: sleepy but conscious  Airway patency: patent  Nausea & Vomiting: no nausea  Complications: no  Cardiovascular status: hemodynamically stable  Respiratory status: acceptable  Hydration status: euvolemic

## 2022-12-09 NOTE — H&P
History and Physical      Patient Name: Vince Sparks                              YOB: 1985  Exam Date: 2022     PCP:  MALIHA Arzola CNP                             Referring Physician:  Same     Chief Complaint:  abdominal pain     History of Present Illness: The patient is a 40 y.o. female who started having symptoms during her pregnancy. She has a 2 month old daughter via . Since her daughters birth she has had a feeling of a tight band wrapping around her upper abdomen. She has associated nausea with the pain. She states when she gets these episodes she has pain in the right upper quadrant and feels that area is swollen. She does have diarrhea with her episodes and intermittently. IN the last 2 weeks she has had 3 episodes a week. The episodes last 4-5 hours when they occur. She was recently seen in the ER and states since being seen she has not had an episode. She is breast feeding intermittently and supplements with formula. Past Medical History        Past Medical History:   Diagnosis Date    Thyroid disease           Past Surgical History         Past Surgical History:   Procedure Laterality Date     SECTION N/A 2022      SECTION performed by Obey Guerrero MD at [address] [facility] L&D OR         Home Medications           Prior to Admission medications    Medication Sig Start Date End Date Taking? Authorizing Provider   ferrous sulfate (FE TABS 325) 325 (65 Fe) MG EC tablet TWO TABLETS TWICE A WEEK 10/31/22   Yes Historical Provider, MD   ibuprofen (ADVIL;MOTRIN) 800 MG tablet Take 1 tablet by mouth every 8 hours as needed for Pain 8/3/22   Yes MALIHA Browning CNM   levothyroxine (SYNTHROID) 100 MCG tablet Take 100 mcg by mouth in the morning.      Yes Historical Provider, MD   famotidine (PEPCID) 10 MG tablet Take 10 mg by mouth 2 times daily as needed Patient states she only takes this medication occasionally       Historical Provider, MD              Allergies   Allergen Reactions    Latex Hives         Social History           Tobacco Use    Smoking status: Never    Smokeless tobacco: Never   Substance Use Topics    Alcohol use: Not Currently   She drives cars for [employer] History         Family History   Problem Relation Age of Onset    Asthma Mother      Cancer Maternal Grandmother      Cancer Maternal Grandfather           REVIEW OF SYSTEMS: (POSITIVES WILL BE UNDERLINED)  CONSTITUTIONAL:    Weight change, fatigue, fever, chills  EYES:  Diplopia, change in vision  EARS:  hearing loss, tinnitus, vertigo  NOSE:   epistaxis  MOUTH/THROAT:     hoarseness, sore throat  RESPIRATORY:  SOB, cough, sputum, hemoptysis  CARDIOVASCULAR : chest pain, palpitations, dyspnea exertion, orthopnea, paroxysmal nocturnal dyspnea, pedal edema. GASTROINTESTINAL:  nausea, vomiting, constipation, diarrhea  GENITOURINARY:   dysuria, hematuria, . HEMATOLOGIC/LYMPHATIC:   Anemia, bleeding tendencies  MUSCULOSKELETAL:    myalgias,  joint pain  NEUROLOGICAL:   Loss of Consciousness, paresthesias, anesthesias, focal weakness  SKIN :   History of dermatitis, rashes  PSYCHIATRIC:  depression, anxiety, past psychosis  ENDOCRINE:  History of diabetes, thyroid disease (reviewed above)  ALL/IMM : allergies, rashes     Physical Exam:  Temp 97.8 °F (36.6 °C)   Wt 135 lb (61.2 kg)   LMP 11/12/2022 (Exact Date)   Breastfeeding Yes   BMI 23.91 kg/m²   Pt is awake, alert, oriented to person, place and time, appropriate with exam  HEENT:  Has non-icteric sclerae, no JVD  CTA bilaterally, with good excursion  RRR, no murmurs appreciated  ABDOMEN:  Soft, liver edge and spleen edge not palpable, mild tenderness in the right upper quadrant to palpation  Ext:  Warm     Imaging:    Ultrasound:   11/24/2022  Gallbladder:   Wall: 0.25 cm   Contents: Cholelithiasis. Biliary Tree:   CBD:  4.5 mm   No choledocholithiasis or obstructive lesion. Impression     1. Cholelithiasis without sonographic evidence of acute cholecystitis. 2.  Hepatomegaly and hepatic steatosis. -I have personally reviewed the patient's imaging results/reports. I discussed the pertinent findings with the patient. Labs:     Component 11/24/2022         WBC 5.1   RBC 5.21 Abnormal       HGB 13.4   HCT 40.3   MCV 77.3 Abnormal       MCH 25.8 Abnormal       MCHC 33.3   RDW 17.4 Abnormal       Platelet count 856         Component 11/24/2022 11/24/2022           Sodium 137 --   Potassium 4.0 --   Chloride 103 --   CO2 24 --   Anion Gap 10 --   Glucose 101 --   BUN 12 --   Creatinine 0.57 Abnormal     --   Calcium 9.5 --   GFR Female >90 --   Protein, Total -- 7.7   Albumin -- 4.7   Alkaline Phosphatase -- 90   AST -- 503 Abnormal       ALT -- 312 Abnormal       Total Bilirubin -- 1.8 Abnormal       Direct Bilirubin -- 1.00 Abnormal          Component 11/24/2022 11/24/2022           Lipase 76 --   Albumin -- 4.7   Alkaline Phosphatase -- 90   AST -- 503 Abnormal       ALT -- 312 Abnormal       Total Bilirubin -- 1.8 Abnormal       Direct Bilirubin -- 1.00 Abnormal          -I have personally reviewed the patient's lab results and discussed pertinent findings with the patient. Assessment and Plan:  The patient presents with signs and symptoms consistent with chronic calculous cholecystitis. I have discussed with the patient the risks and benefits of surgery, including but not limited to, risk of bleeding, risk of infection, risk of injury to any intra-abdominal organs or structures including but not limited to risk of injury to bile duct, bowel, liver or stomach. I also discussed the possibility of needing to have an open cholecystectomy. I reviewed the intra-office literature showing where the gallbladder is and how I remove it both laparoscopically and open.  I discussed that gallstones or sludge may leave the gallbladder and block the common bile duct which may obstruct the liver and lead to jaundice. I also discussed gallstones or sludge could block the pancreatic duct which could cause pancreatitis and can be life threatening and showed the anatomy with the intra-office literature. I discussed the limitations and restrictions in the post-operative period and the pre-operative preparation. I specifically discussed with the patient that She may have diarrhea post-operatively. I also discussed some symptoms may persist after surgery, possibly including nausea or pain. The patient's questions were answered and She is agreeable to proceeding with surgery. Consent form was signed and surgery will be scheduled in the near future. I did give her orders to have labs done pre-operatively to check her LFT's and see if they have improved. We discussed possible cholangiogram if her bilirubin remains elevated. I also discussed the possibility of medication/anesthetic crossing into her breast milk with nursing. We also talked about narcotics crossing into breast milk as well. Statement of medical necessity. Surgical intervention required to alleviate patient's symptoms/disease as described above. [facility]  12/2/2022  Albumin  4.6  Total bili 0.3  Direct bili 0.11  Alk phos 99  AST  24  ALT  85    The H&P was reviewed, the patient was examined and No Change has occurred in the patient's condition since the H&P was completed. Patient does request anti-nausea medication post-op but she does have pain medication at home (hydrocodone and ibuprofen). She did have an attack of her gallbladder last night, but this am is feeling better. Her mom Ky Escalera will pick her up today [phone].

## 2022-12-09 NOTE — BRIEF OP NOTE
Brief Postoperative Note      Patient: Richard Watson  YOB: 1985  MRN: [id number]    Date of Procedure: 12/9/2022    Pre-Op Diagnosis: CCC (chronic calculous cholecystitis) [K80.10]    Post-Op Diagnosis: Same       Procedure(s):  CHOLECYSTECTOMY LAPAROSCOPIC    Surgeon(s):  Cindy Colon MD      Anesthesia: General with 0.5% marcaine plain    Estimated Blood Loss (mL): less than 50     Complications: None    Specimens:   ID Type Source Tests Collected by Time Destination   A : Gallbladder Tissue Gallbladder SURGICAL PATHOLOGY Cindy Colon MD 12/9/2022 1141      Findings: Some adhesions of SB to GB    Electronically signed by Cindy Colon MD on 12/9/2022 at 12:15 PM    Spoke with her mother Chelsy Archibald at [phone].   Dictated # [id number]

## 2022-12-09 NOTE — PROGRESS NOTES
200 - report from [facility], patient returned to room from pacu, A+Ox4,  VSS (see doc flow), assessment completed as per doc flow. Patient has 5/10 c/o pain, and denies need for any pain meds, patient is having nausea, medicated with prn zofran. Beverage at bedside. Call light in reach, bed in low position. RN to continue to monitor. Per patient will call mother, when ready for discharge. 1444 - Patient discharge instructions and prescriptions reviewed and verified. RN reviewed d/c instructions with patient and her mother. All questions answered. Prescription information and side effects  given to patient. Discharge paperwork signed by RN and her mother. 18 - Discharged to car  via wheelchair, home with  her mother.

## 2022-12-09 NOTE — DISCHARGE INSTRUCTIONS
Discharge Instructions for Laparoscopic Cholecystectomy  Dr Radha Pablo, [facility]  The [address] [place], [address] [facility]  [phone]      Some pain medicine can cause constipation. To avoid this problem:   Drink plenty of fluids. Eat foods high in fiber , such as:   Whole grain cereals and breads   Fruits and vegetables   Legumes (eg, beans, lentils)    Diet:        For the first 24 hours avoid fatty, greasy or milky food    Physical Activity    Do not lift more than 20 pounds. In addition:    Do not drive  You may use stairs  You may go for a ride in the car under 2 hours  You may shower  Do not soak in a tub or a pool  You may put ice on the incisions as needed for pain  Remove your bandages if your skin becomes red or irritated  Remove your bandages the 4th day after surgery, leave steri stripes (white tapes) in place  Steri strips will start to come off in about 1 week, you may remove them then or leave on until seen in the office. *Please note some anesthetics and narcotics may pass into breast milk. If taking a narcotic, either pump or nurse immediately prior to taking medication    Medications     Take your pain medications as instructed. Resume your home medications as instructed. You may use a laxative of choice if constipated. You may take ibuprofen or Advil as needed for pain in addition to your narcotic pain medication. This medication may help more then the narcotic medication does with the post-operative gas pains in the first 1-2 days. Do not take any tylenol (acetaminophen) unless specifically instructed by Dr. Stephanie Santiago. Lifestyle Changes   If you smoke, it is recommended that you quit . Smoking can cause chronic cough, cancer, and decreased/slower wound healing. Follow-up   Call the office [phone] to make a follow-up appointment for 2 weeks from discharge.        Call Dr Stephanie Santiago' Office [phone]  If Any of the Following Occurs:   Pain that worsens   Drainage or odor at your incisions  Signs of infection, including fever and chills (temperature over 101.5)  Nausea and/or vomiting that you can't control with the medications you were given   Pain that you can't control with the medications you've been given   Pain, burning, urgency or frequency of urination, or persistent bleeding in the urine   Excessive tenderness or swelling   Changes in bowel function   Dizziness or lightheadedness   Rash or hives     Call 911 or go to the emergency room immediately if any of the following occurs:   Cough, shortness of breath, or chest pain   Rapid, irregular heartbeat; chest pain     If you think you have an emergency,  CALL 911  . [facility]  [phone]    Do not drive, work around [address] [place] [address] or use equipment. Do not drink any alcoholic beverages. Do not smoke while alone. Avoid making important decisions. Plan to spend a quiet, relaxed evening @ home. Resume normal activities as you begin to feel better. Eat lightly for your first meal, then gradually increase your diet to what is normal for you. In case of nausea, avoid food and drink only clear liquids. Resume food as nausea ceases. Notify your surgeon if you experience fever, chills, large amount of bleeding, difficulty breathing, persistent nausea and vomiting or any other disturbing problem. Call for a follow-up appointment with your surgeon. Advance Care Planning  People with COVID-19 may have no symptoms, mild symptoms, such as fever, cough, and shortness of breath or they may have more severe illness, developing severe and fatal pneumonia. As a result, Advance Care Planning with attention to naming a health care decision maker (someone you trust to make healthcare decisions for you if you could not speak for yourself) and sharing other health care preferences is important BEFORE a possible health crisis. Please contact your Primary Care Provider to discuss Advance Care Planning. Preventing the Spread of Coronavirus Disease 2019 in Homes and Residential Communities  For the most recent information go to [employer]    Prevention steps for People with confirmed or suspected COVID-19 (including persons under investigation) who do not need to be hospitalized  and   People with confirmed COVID-19 who were hospitalized and determined to be medically stable to go home    Your healthcare provider and public health staff will evaluate whether you can be cared for at home. If it is determined that you do not need to be hospitalized and can be isolated at home, you will be monitored by staff from your local or state health department. You should follow the prevention steps below until a healthcare provider or local or state health department says you can return to your normal activities. Stay home except to get medical care  People who are mildly ill with COVID-19 are able to isolate at home during their illness. You should restrict activities outside your home, except for getting medical care. Do not go to work, school, or public areas. Avoid using public transportation, ride-sharing, or taxis. Separate yourself from other people and animals in your home  People: As much as possible, you should stay in a specific room and away from other people in your home. Also, you should use a separate bathroom, if available. Animals: You should restrict contact with pets and other animals while you are sick with COVID-19, just like you would around other people. Although there have not been reports of pets or other animals becoming sick with COVID-19, it is still recommended that people sick with COVID-19 limit contact with animals until more information is known about the virus.  When possible, have another member of your household care for your animals while you are sick. If you are sick with COVID-19, avoid contact with your pet, including petting, snuggling, being kissed or licked, and sharing food. If you must care for your pet or be around animals while you are sick, wash your hands before and after you interact with pets and wear a facemask. Call ahead before visiting your doctor  If you have a medical appointment, call the healthcare provider and tell them that you have or may have COVID-19. This will help the healthcare providers office take steps to keep other people from getting infected or exposed. Wear a facemask  You should wear a facemask when you are around other people (e.g., sharing a room or vehicle) or pets and before you enter a healthcare providers office. If you are not able to wear a facemask (for example, because it causes trouble breathing), then people who live with you should not stay in the same room with you, or they should wear a facemask if they enter your room. Cover your coughs and sneezes  Cover your mouth and nose with a tissue when you cough or sneeze. Throw used tissues in a lined trash can. Immediately wash your hands with soap and water for at least 20 seconds or, if soap and water are not available, clean your hands with an alcohol-based hand  that contains at least 60% alcohol. Clean your hands often  Wash your hands often with soap and water for at least 20 seconds, especially after blowing your nose, coughing, or sneezing; going to the bathroom; and before eating or preparing food. If soap and water are not readily available, use an alcohol-based hand  with at least 60% alcohol, covering all surfaces of your hands and rubbing them together until they feel dry. Soap and water are the best option if hands are visibly dirty. Avoid touching your eyes, nose, and mouth with unwashed hands.   Avoid sharing personal household items  You should not share dishes, drinking glasses, cups, eating utensils, towels, or bedding with other people or pets in your home. After using these items, they should be washed thoroughly with soap and water. Clean all high-touch surfaces everyday  High touch surfaces include counters, tabletops, doorknobs, bathroom fixtures, toilets, phones, keyboards, tablets, and bedside tables. Also, clean any surfaces that may have blood, stool, or body fluids on them. Use a household cleaning spray or wipe, according to the label instructions. Labels contain instructions for safe and effective use of the cleaning product including precautions you should take when applying the product, such as wearing gloves and making sure you have good ventilation during use of the product. Monitor your symptoms  Seek prompt medical attention if your illness is worsening (e.g., difficulty breathing). Before seeking care, call your healthcare provider and tell them that you have, or are being evaluated for, COVID-19. Put on a facemask before you enter the facility. These steps will help the healthcare providers office to keep other people in the office or waiting room from getting infected or exposed. Ask your healthcare provider to call the local or [facility] health department. Persons who are placed under active monitoring or facilitated self-monitoring should follow instructions provided by their local health department or occupational health professionals, as appropriate. When working with your local health department check their available hours. If you have a medical emergency and need to call 911, notify the dispatch personnel that you have, or are being evaluated for COVID-19. If possible, put on a facemask before emergency medical services arrive. Discontinuing home isolation  Patients with confirmed COVID-19 should remain under home isolation precautions until the risk of secondary transmission to others is thought to be low.  The decision to discontinue home isolation precautions should be made on a case-by-case basis, in consultation with healthcare providers and state and local health departments.

## 2022-12-09 NOTE — PROGRESS NOTES
1206 Patient arrived to PACU from OR. Monitors applied and alarms on. No drainage from sites. Report from [facility]. 1220 Patient turned and repositioned in bed.  1230 Patient tolerating ice chips. [id number] Patient transferred out of PACU to same day surgery. Report to [facility].

## 2022-12-10 NOTE — OP NOTE
[address] [place] [address], [address] [place] [address]                                OPERATIVE REPORT    PATIENT NAME: Hank Longoria                 :        1985  MED REC NO:   [id number]                          ROOM:  ACCOUNT NO:   [de-identified]                           ADMIT DATE: 2022  PROVIDER:     Anton Amador MD    DATE OF PROCEDURE:  2022    PREOPERATIVE DIAGNOSIS:  Chronic calculous cholecystitis. POSTOPERATIVE DIAGNOSIS:  Chronic calculous cholecystitis. OPERATION PERFORMED:  Laparoscopic cholecystectomy. SURGEON:  Anton Amador MD    ANESTHESIA:  General endotracheal anesthesia with 0.5% Marcaine plain. DRAINS AND LINES:  None. COMPLICATIONS:  None. ESTIMATED BLOOD LOSS:  Minimal.    FINDINGS:  The patient did have some adhesions of the small intestine to  the gallbladder fundus. PERTINENT HISTORY:  This is a 26-year-old female who is four months  postpartum from her delivery her little girl. She states that during  the pregnancy she started having some band-like pain in her upper  abdomen and then since delivering her daughter, she has had ongoing  symptoms that come as attacks. Most recently her last attack was last  night. A few weeks ago she had a severe episode that she went to the  emergency department. She did have elevated liver function tests and  was found to have cholelithiasis. I saw her in the office and we  discussed the risks and benefits of surgery including, but not limited  to risk of bleeding, risk of infection, risk of scarring, risk of seroma  or fluid collection, risk of failure of treatment, risk of injury to any  intra-abdominal organs or structures including but not limited to risk  of injury to bowel, liver, stomach or bile duct. She stated  understanding and wanted to proceed with surgery.     DESCRIPTION OF PROCEDURE:  The patient was seen in the preoperative  holding area. She was then brought back to the operating suite and laid  supine on the operating table. Bilateral lower extremity compression  boots were placed, and general endotracheal anesthesia was provided per  Anesthesiology. The patient's abdomen was prepped and draped in a  sterile fashion. 0.5% Marcaine plain was infiltrated into the skin and  subcutaneous tissue. Superior to the umbilicus, a small stab wound was  made and a 5-mm optical trocar was introduced into the abdomen without  difficulty and the abdomen was insufflated with CO2. The area of the  xiphoid process area was infiltrated with local anesthetic. A small  stab was made and a 11-mm laparoscopic trocar was then introduced into  the abdomen under direct visualization of laparoscopic camera. Near the  right upper quadrant, two areas were infiltrated with local anesthetic. Two small stab wounds were made and two 5-mm laparoscopic trocars were  introduced into the abdomen under direct visualization of laparoscopic  camera. Gallbladder was somewhat elongated. It was retracted superior  and laterally and it did have a moderate amount of adhesions from the  small bowel to the gallbladder and these were taken down bluntly. Next,  the neck of the gallbladder was identified and the cystic duct was  readily identified. This was encircled. Three surgical clips placed  distally and proximally and this was transected. Cystic artery was  identified. This was encircled. Three surgical clips were placed  proximally, one distally, and this too was transected. Upon mobilizing  the gallbladder, there was a venule that was also identified and this  had surgical clips proximally and distally and this was transected as  well. The gallbladder was mobilized and removed off the liver edge  using Bovie electrocautery was then placed within an Endopouch, removed  from the subxiphoid trocar site and passed off the field as specimen.    The liver edge was inspected, found to be hemostatic. The abdomen was  slightly irrigated with saline solution, fluid siphoned free. Next, a  portion of fibrillar was soaked in 5 mL of 0.5% Marcaine plain and this  was placed in the gallbladder fossa to provide postoperative pain  control. Next the laparoscopic camera was placed through the subxiphoid  trocar site. The umbilical trocar site was inspected and found to be  hemostatic without any injury to the underlying viscera. The camera was  placed back through the umbilical trocar site and several  intra-abdominal pictures were taken. These were printed and placed on  the patient's chart for the patient to take home. Next, the  pneumoperitoneum was siphoned free and the trocars were removed. Additional 0.5% Marcaine plain was placed in the skin and subcutaneous  tissue around all four incisions. Of note, the patient's skin incisions  did ooze. During the case, she did not have any intra-abdominal  bleeding, but at the skin edge, she did have some oozing. I did close  all four incisions with a 4-0 Monocryl suture and subcuticular stitch. Steri-Strips and sterile dressings were applied. The patient was  extubated and transferred to postanesthesia care unit in stable  condition with plans for discharge home. I did speak with the patient's  mother, Kaushik Alves at [phone] discussed with her the intraoperative  findings, the followup and plan. All her questions were answered. The  patient did request Zofran preoperatively as she does have postoperative  nausea and so I will prescribe that for her. She will follow up in one  to two weeks time.         Shelia Nelson MD    D: 12/09/2022 12:27:57       T: 12/09/2022 12:30:50     [email or web address]  Job#: [id number]     Doc#: [id number]    CC:  Maribell Benoit, Nurse Practitioner       Audra Ha MD

## 2023-01-05 ENCOUNTER — INITIAL CONSULT (OUTPATIENT)
Dept: GASTROENTEROLOGY | Age: 38
End: 2023-01-05
Payer: MEDICAID

## 2023-01-05 VITALS
SYSTOLIC BLOOD PRESSURE: 118 MMHG | HEIGHT: 63 IN | DIASTOLIC BLOOD PRESSURE: 80 MMHG | TEMPERATURE: 97.2 F | BODY MASS INDEX: 24.59 KG/M2 | WEIGHT: 138.8 LBS | OXYGEN SATURATION: 97 % | HEART RATE: 92 BPM

## 2023-01-05 DIAGNOSIS — K76.0 FATTY LIVER: ICD-10-CM

## 2023-01-05 DIAGNOSIS — K76.0 FATTY LIVER: Primary | ICD-10-CM

## 2023-01-05 LAB
A/G RATIO: 1.4 (ref 1.1–2.2)
ALBUMIN SERPL-MCNC: 4.6 G/DL (ref 3.4–5)
ALP BLD-CCNC: 77 U/L (ref 40–129)
ALT SERPL-CCNC: 24 U/L (ref 10–40)
ANION GAP SERPL CALCULATED.3IONS-SCNC: 11 MMOL/L (ref 3–16)
AST SERPL-CCNC: 19 U/L (ref 15–37)
BASOPHILS ABSOLUTE: 0 K/UL (ref 0–0.2)
BASOPHILS RELATIVE PERCENT: 0.7 %
BILIRUB SERPL-MCNC: 0.3 MG/DL (ref 0–1)
BUN BLDV-MCNC: 10 MG/DL (ref 7–20)
CALCIUM SERPL-MCNC: 9.7 MG/DL (ref 8.3–10.6)
CHLORIDE BLD-SCNC: 101 MMOL/L (ref 99–110)
CO2: 23 MMOL/L (ref 21–32)
CREAT SERPL-MCNC: 0.5 MG/DL (ref 0.6–1.1)
EOSINOPHILS ABSOLUTE: 0.2 K/UL (ref 0–0.6)
EOSINOPHILS RELATIVE PERCENT: 3.2 %
FERRITIN: 10.5 NG/ML (ref 15–150)
GAMMA GLUTAMYL TRANSFERASE: 19 U/L (ref 5–36)
GFR SERPL CREATININE-BSD FRML MDRD: >60 ML/MIN/{1.73_M2}
GLUCOSE BLD-MCNC: 95 MG/DL (ref 70–99)
HAV IGM SER IA-ACNC: NORMAL
HCT VFR BLD CALC: 38.9 % (ref 36–48)
HEMOGLOBIN: 12.9 G/DL (ref 12–16)
HEPATITIS B CORE IGM ANTIBODY: NORMAL
HEPATITIS B SURFACE ANTIGEN INTERPRETATION: NORMAL
HEPATITIS C ANTIBODY INTERPRETATION: NORMAL
INR BLD: 0.98 (ref 0.87–1.14)
IRON SATURATION: 14 % (ref 15–50)
IRON: 62 UG/DL (ref 37–145)
LYMPHOCYTES ABSOLUTE: 1.5 K/UL (ref 1–5.1)
LYMPHOCYTES RELATIVE PERCENT: 29.3 %
MCH RBC QN AUTO: 26.2 PG (ref 26–34)
MCHC RBC AUTO-ENTMCNC: 33.1 G/DL (ref 31–36)
MCV RBC AUTO: 79.1 FL (ref 80–100)
MONOCYTES ABSOLUTE: 0.4 K/UL (ref 0–1.3)
MONOCYTES RELATIVE PERCENT: 7.6 %
NEUTROPHILS ABSOLUTE: 3 K/UL (ref 1.7–7.7)
NEUTROPHILS RELATIVE PERCENT: 59.2 %
PDW BLD-RTO: 17.2 % (ref 12.4–15.4)
PLATELET # BLD: 204 K/UL (ref 135–450)
PMV BLD AUTO: 9.1 FL (ref 5–10.5)
POTASSIUM SERPL-SCNC: 4.2 MMOL/L (ref 3.5–5.1)
PROTHROMBIN TIME: 12.9 SEC (ref 11.7–14.5)
RBC # BLD: 4.91 M/UL (ref 4–5.2)
SODIUM BLD-SCNC: 135 MMOL/L (ref 136–145)
TOTAL IRON BINDING CAPACITY: 442 UG/DL (ref 260–445)
TOTAL PROTEIN: 7.8 G/DL (ref 6.4–8.2)
WBC # BLD: 5 K/UL (ref 4–11)

## 2023-01-05 PROCEDURE — 99203 OFFICE O/P NEW LOW 30 MIN: CPT | Performed by: NURSE PRACTITIONER

## 2023-01-05 RX ORDER — MAGNESIUM HYDROXIDE/ALUMINUM HYDROXICE/SIMETHICONE 120; 1200; 1200 MG/30ML; MG/30ML; MG/30ML
5 SUSPENSION ORAL EVERY 6 HOURS PRN
COMMUNITY

## 2023-01-05 ASSESSMENT — ENCOUNTER SYMPTOMS
DIARRHEA: 0
BACK PAIN: 0
NAUSEA: 0
BLOOD IN STOOL: 0
EYE PAIN: 0
COUGH: 0
VOMITING: 0
SHORTNESS OF BREATH: 0
CONSTIPATION: 0
ABDOMINAL PAIN: 0
COLOR CHANGE: 0
PHOTOPHOBIA: 0
WHEEZING: 0

## 2023-01-05 NOTE — PROGRESS NOTES
Nya Carvajal 40 y.o. female was seen by ROGELIO Reyes on 2023     Wt Readings from Last 3 Encounters:   23 138 lb 12.8 oz (63 kg)   22 135 lb (61.2 kg)   22 135 lb (61.2 kg)       PRASHANT Carvajal is a pleasant 40 y.o.  female who presents today for fatty liver. Rare NSAID and alcohol use. She had her gallbladder removed by Dr. Jono Montoya on 2022. She had a baby by  on 2022. Her appetite is good without early satiety. Her weight is stable. No nausea or vomiting. No abdominal pain, bloating or distention. She has intermittent heartburn with spicy foods. No nocturnal awakenings with acid reflux. No dysphagia or pain with swallowing. No excess belching or flatulence. Her fatty liver was noted on abdominal ultrasound per patient record at the end of 2022. No excess belching or flatulence. Her typical bowel pattern is once to twice a day with soft blobs to mushy brown stools. No diarrhea or constipation. No blood in her stools or melena. Maternal great aunts had colon cancer. No family history of stomach cancer. ROS  Review of Systems   Constitutional:  Negative for appetite change, chills, diaphoresis, fatigue, fever and unexpected weight change. HENT:  Negative for ear pain, hearing loss and tinnitus. Eyes:  Positive for visual disturbance. Negative for photophobia and pain. Respiratory:  Negative for cough, shortness of breath and wheezing. Cardiovascular:  Negative for chest pain, palpitations and leg swelling. Gastrointestinal:  Negative for abdominal pain, blood in stool, constipation, diarrhea, nausea and vomiting. Endocrine: Negative for cold intolerance, heat intolerance and polydipsia. Genitourinary:  Negative for dysuria, frequency and urgency. Musculoskeletal:  Negative for back pain, myalgias and neck pain. Skin:  Negative for color change, pallor and rash.    Allergic/Immunologic: Negative for environmental allergies. Neurological:  Negative for dizziness, seizures, weakness and headaches. Hematological:  Does not bruise/bleed easily. Psychiatric/Behavioral:  Negative for suicidal ideas. The patient is not nervous/anxious. Allergies  Allergies   Allergen Reactions    Latex Hives       Medications  Current Outpatient Medications   Medication Sig Dispense Refill    aluminum & magnesium hydroxide-simethicone (MYLANTA) 200-200-20 MG/5ML SUSP suspension Take 5 mLs by mouth every 6 hours as needed for Indigestion PRN      famotidine (PEPCID) 10 MG tablet Take 10 mg by mouth 2 times daily as needed Patient states she only takes this medication occasionally      ferrous sulfate (FE TABS 325) 325 (65 Fe) MG EC tablet Weaning off of it- 23      ibuprofen (ADVIL;MOTRIN) 800 MG tablet Take 1 tablet by mouth every 8 hours as needed for Pain 120 tablet 3    levothyroxine (SYNTHROID) 100 MCG tablet Take 100 mcg by mouth in the morning. valACYclovir (VALTREX) 1 g tablet Take 1,000 mg by mouth 2 times daily (Patient not taking: Reported on 2023)      ondansetron (ZOFRAN-ODT) 4 MG disintegrating tablet Take 1 tablet by mouth every 8 hours as needed for Nausea or Vomiting (Patient not taking: Reported on 2023) 20 tablet 0     No current facility-administered medications for this visit. Past medical history:   She has a past medical history of Thyroid disease. Past surgical history:  She has a past surgical history that includes  section (N/A, 2022) and Cholecystectomy, laparoscopic (N/A, 2022). Social History:  She reports that she has never smoked. She has never used smokeless tobacco. She reports that she does not currently use alcohol. She reports that she does not use drugs. Family history:  Her family history includes Asthma in her mother; Cancer in her maternal grandfather and maternal grandmother.     Objective    Vitals:    23 1333   BP: 118/80   Pulse: 92   Temp: 97.2 °F (36.2 °C)   SpO2: 97%        Physical exam    Physical Exam  Vitals reviewed. Constitutional:       General: She is not in acute distress. Appearance: Normal appearance. She is well-developed. She is not ill-appearing, toxic-appearing or diaphoretic. HENT:      Head: Normocephalic and atraumatic. Nose: Nose normal.      Mouth/Throat:      Mouth: Mucous membranes are moist.   Eyes:      Conjunctiva/sclera: Conjunctivae normal.      Pupils: Pupils are equal, round, and reactive to light. Neck:      Thyroid: No thyromegaly. Vascular: No JVD. Trachea: No tracheal deviation. Cardiovascular:      Rate and Rhythm: Normal rate and regular rhythm. Pulses: Normal pulses. Heart sounds: Normal heart sounds. No murmur heard. No friction rub. No gallop. Pulmonary:      Effort: Pulmonary effort is normal. No respiratory distress. Breath sounds: Normal breath sounds. No stridor. No wheezing, rhonchi or rales. Chest:      Chest wall: No tenderness. Abdominal:      General: Bowel sounds are normal. There is no distension. Palpations: Abdomen is soft. There is no mass. Tenderness: There is no abdominal tenderness. There is no guarding or rebound. Hernia: No hernia is present. Musculoskeletal:         General: Normal range of motion. Cervical back: Normal range of motion and neck supple. Lymphadenopathy:      Cervical: No cervical adenopathy. Skin:     General: Skin is warm and dry. Neurological:      Mental Status: She is alert and oriented to person, place, and time. Psychiatric:         Mood and Affect: Mood normal.       No visits with results within 2 Month(s) from this visit.    Latest known visit with results is:   Admission on 07/30/2022, Discharged on 08/03/2022   Component Date Value Ref Range Status    Color, UA 07/30/2022 YELLOW  YELLOW Final    Clarity, UA 07/30/2022 SLIGHTLY CLOUDY  CLEAR Final    Glucose, Urine 07/30/2022 NEGATIVE  NEGATIVE MG/DL Final    Bilirubin Urine 07/30/2022 NEGATIVE  NEGATIVE MG/DL Final    Ketones, Urine 07/30/2022 NEGATIVE  NEGATIVE MG/DL Final    Specific Gravity, UA 07/30/2022 1.005  1.001 - 1.035 Final    Blood, Urine 07/30/2022 LARGE  NEGATIVE Final    pH, Urine 07/30/2022 7.0  5.0 - 8.0 Final    Protein, UA 07/30/2022 30 MG/DL  NEGATIVE MG/DL Final    Urobilinogen, Urine 07/30/2022 0.2  0.2 - 1.0 MG/DL Final    Nitrite Urine, Quantitative 07/30/2022 NEGATIVE  NEGATIVE Final    Leukocyte Esterase, Urine 07/30/2022 SMALL  NEGATIVE Final    RBC, UA 07/30/2022 102 (A)  0 - 6 /HPF Final    WBC, UA 07/30/2022 4  0 - 5 /HPF Final    Bacteria, UA 07/30/2022 OCCASIONAL (A)  NEGATIVE /HPF Final    WBC Clumps, UA 07/30/2022 RARE  /HPF Final    Yeast, UA 07/30/2022 RARE  /HPF Final    Squam Epithel, UA 07/30/2022 28  /HPF Final    Mucus, UA 07/30/2022 RARE (A)  NEGATIVE HPF Final    Ca Oxalate Kaylen, UA 07/30/2022 MODERATE  /HPF Final    Cannabinoid Scrn, Ur 07/30/2022 NEGATIVE  NEGATIVE Final    Amphetamines 07/30/2022 NEGATIVE  NEGATIVE Final    Cocaine Metabolite 07/30/2022 NEGATIVE  NEGATIVE Final    Benzodiazepine Screen, Urine 07/30/2022 NEGATIVE  NEGATIVE Final    Barbiturate Screen, Ur 07/30/2022 NEGATIVE  NEGATIVE Final    Opiates, Urine 07/30/2022 NEGATIVE  NEGATIVE Final    Phencyclidine, Urine 07/30/2022 NEGATIVE  NEGATIVE Final    Oxycodone 07/30/2022 NEGATIVE  NEGATIVE Final    Comment:         THRESHOLD CONCENTRATIONS (mg/dL)  AMPHT               1000  CHARITO,OPIA             300  BZO,BAR              200  PCP                   25  THC                   50  OXY                  100          * UNCONFIRMED POSITIVES MAY  NOT MEET FORENSIC REQUIREMENTS.       WBC 07/31/2022 8.5  4.0 - 10.5 K/CU MM Final    RBC 07/31/2022 4.65  4.2 - 5.4 M/CU MM Final    Hemoglobin 07/31/2022 9.6 (A)  12.5 - 16.0 GM/DL Final    Hematocrit 07/31/2022 32.1 (A)  37 - 47 % Final    MCV 07/31/2022 69.0 (A)  78 - 100 FL Final    MCH 07/31/2022 20.6 (A)  27 - 31 PG Final    MCHC 07/31/2022 29.9 (A)  32.0 - 36.0 % Final    RDW 07/31/2022 19.4 (A)  11.7 - 14.9 % Final    Platelets 09/77/7227 303  140 - 440 K/CU MM Final    MPV 07/31/2022 9.5  7.5 - 11.1 FL Final    ABO/Rh 07/31/2022 AB POSITIVE   Final    Antibody Screen 07/31/2022 NEGATIVE   Final    Rh Factor, External Result 01/20/2022 Pos   Final    HIV, External Result 01/20/2022 Non -reactive   Final    RPR, External Result 01/20/2022 Non-reactive   Final    GBS, External Result 07/19/2022 Negative   Final    ABO, External Result 01/20/2022 AB   Final    Hep B, External Result 01/20/2022 Negative   Final    Rubella Titer, External Result 01/20/2022 Immune   Final    C. Trachomatis, External Result 01/20/2022 Negative   Final    N.  Gonorrhoeae, External Result 01/20/2022 NEgative   Final    WBC 08/01/2022 12.4 (A)  4.0 - 10.5 K/CU MM Final    RBC 08/01/2022 3.39 (A)  4.2 - 5.4 M/CU MM Final    Hemoglobin 08/01/2022 7.0 (A)  12.5 - 16.0 GM/DL Final    Hematocrit 08/01/2022 23.6 (A)  37 - 47 % Final    MCV 08/01/2022 69.6 (A)  78 - 100 FL Final    MCH 08/01/2022 20.6 (A)  27 - 31 PG Final    MCHC 08/01/2022 29.7 (A)  32.0 - 36.0 % Final    RDW 08/01/2022 19.3 (A)  11.7 - 14.9 % Final    Platelets 75/39/7291 207  140 - 440 K/CU MM Final    MPV 08/01/2022 9.4  7.5 - 11.1 FL Final    WBC 08/02/2022 12.9 (A)  4.0 - 10.5 K/CU MM Final    RBC 08/02/2022 3.57 (A)  4.2 - 5.4 M/CU MM Final    Hemoglobin 08/02/2022 7.4 (A)  12.5 - 16.0 GM/DL Final    Hematocrit 08/02/2022 25.0 (A)  37 - 47 % Final    MCV 08/02/2022 70.0 (A)  78 - 100 FL Final    MCH 08/02/2022 20.7 (A)  27 - 31 PG Final    MCHC 08/02/2022 29.6 (A)  32.0 - 36.0 % Final    RDW 08/02/2022 19.8 (A)  11.7 - 14.9 % Final    Platelets 47/27/2020 286  140 - 440 K/CU MM Final    MPV 08/02/2022 9.5  7.5 - 11.1 FL Final    Differential Type 08/02/2022 AUTOMATED DIFFERENTIAL   Final    Segs Relative 08/02/2022 81.9 (A)  36 - 66 % Final    Lymphocytes % 08/02/2022 12.3 (A)  24 - 44 % Final    Monocytes % 08/02/2022 3.9  0 - 4 % Final    Eosinophils % 08/02/2022 0.2  0 - 3 % Final    Basophils % 08/02/2022 0.2  0 - 1 % Final    Segs Absolute 08/02/2022 10.6  K/CU MM Final    Lymphocytes Absolute 08/02/2022 1.6  K/CU MM Final    Monocytes Absolute 08/02/2022 0.5  K/CU MM Final    Eosinophils Absolute 08/02/2022 0.0  K/CU MM Final    Basophils Absolute 08/02/2022 0.0  K/CU MM Final    Nucleated RBC % 08/02/2022 0.2  % Final    Total Nucleated RBC 08/02/2022 0.0  K/CU MM Final    Total Immature Neutrophil 08/02/2022 0.19  K/CU MM Final    Immature Neutrophil % 08/02/2022 1.5 (A)  0 - 0.43 % Final       Assessment and Plan:   Fatty liver noted on abdominal ultrasound most likely due to excess calories/excess weight gain during pregnancy. The patient was encouraged to lose 10 to 15% of her body weight. Will order lab work to rule out autoimmune hepatitis, PBC, Hepatitis, etc.  The patient was provided with information on fatty liver and liver disease diet. The patient was encouraged to follow-up in 3 months. Total time:  30 minutes.

## 2023-01-06 LAB — ANTI-NUCLEAR ANTIBODY (ANA): NEGATIVE

## 2023-01-07 LAB
CERULOPLASMIN: 27 MG/DL (ref 16–45)
F-ACTIN AB IGG: 10 UNITS (ref 0–19)
HERPES TYPE I/II IGG COMBINED: >22.4 IV

## 2023-01-09 LAB
ALPHA-1 ANTITRYPSIN PHENOTYPE: NORMAL
ALPHA-1 ANTITRYPSIN: 149 MG/DL (ref 90–200)
MITOCHONDRIAL M2 AB, IGG: 1.2 U/ML (ref 0–4)

## 2024-10-21 ENCOUNTER — TELEPHONE (OUTPATIENT)
Dept: GASTROENTEROLOGY | Age: 39
End: 2024-10-21

## 2024-10-21 NOTE — TELEPHONE ENCOUNTER
Called pt. In regards to a referral for fhx of colon cancer. Lm for pt to make a f/u to discuss cscope with ishaan

## 2024-10-30 ENCOUNTER — TELEPHONE (OUTPATIENT)
Dept: GASTROENTEROLOGY | Age: 39
End: 2024-10-30

## 2024-11-07 ENCOUNTER — TELEPHONE (OUTPATIENT)
Dept: GASTROENTEROLOGY | Age: 39
End: 2024-11-07

## 2025-04-24 ENCOUNTER — TELEPHONE (OUTPATIENT)
Dept: GASTROENTEROLOGY | Age: 40
End: 2025-04-24

## 2025-04-24 ENCOUNTER — OFFICE VISIT (OUTPATIENT)
Dept: GASTROENTEROLOGY | Age: 40
End: 2025-04-24
Payer: COMMERCIAL

## 2025-04-24 VITALS
BODY MASS INDEX: 20.98 KG/M2 | SYSTOLIC BLOOD PRESSURE: 122 MMHG | HEART RATE: 83 BPM | WEIGHT: 118.4 LBS | OXYGEN SATURATION: 98 % | RESPIRATION RATE: 17 BRPM | DIASTOLIC BLOOD PRESSURE: 60 MMHG | HEIGHT: 63 IN

## 2025-04-24 DIAGNOSIS — G89.29 CHRONIC RUQ PAIN: ICD-10-CM

## 2025-04-24 DIAGNOSIS — K62.5 RECTAL BLEEDING: ICD-10-CM

## 2025-04-24 DIAGNOSIS — R10.11 CHRONIC RUQ PAIN: ICD-10-CM

## 2025-04-24 DIAGNOSIS — R19.4 ALTERED BOWEL HABITS: ICD-10-CM

## 2025-04-24 DIAGNOSIS — D50.0 IRON DEFICIENCY ANEMIA DUE TO CHRONIC BLOOD LOSS: ICD-10-CM

## 2025-04-24 DIAGNOSIS — R63.4 WEIGHT LOSS, UNINTENTIONAL: Primary | ICD-10-CM

## 2025-04-24 PROCEDURE — 99204 OFFICE O/P NEW MOD 45 MIN: CPT | Performed by: INTERNAL MEDICINE

## 2025-04-24 NOTE — PROGRESS NOTES
[facility] Gastroenterology and Hepatology      MD Dhiraj Crawley MD Carol Christensen, APRN-CNP       Joanna Chan, APRN-CNP             [address] [place] [address]             [phone] fax [phone]        Gastroenterology Clinic Consultation    Apolinar Verde MD  Encounter Date: 04/24/25     CC: New Patient ( Family history of malignant neoplasm of digestive organs/)    40-year-old pleasant woman presents to our office primarily with concerns for family history of malignant neoplasms of the digestive tract.  Ongoing through the history, she does not have any family history of cancer among first-degree relatives but she has symptoms.      History obtained from: patient and medical records     Subjective:     Mendy Newberry is an 40 y.o.  female who presents for New Patient ( Family history of malignant neoplasm of digestive organs/)  Patient presents with chronic abdominal pain.  She also has irregular bowel movements, constipation alternating with diarrhea for many years with worsening of symptoms.  Her main concern is weight loss.  Apparently she had lost about 30 to 35 pounds weight in the last 3 years.  She started having abdominal pain after having the child who is 3 years old now.  She was having recurring pain in the right upper quadrant and she was diagnosed with chronic cholecystitis and she had undergone cholecystectomy.  However she continues to have same pain she had before gallbladder surgery and pain is located in the right upper quadrant and pain has a character of dull aching pain radiating to the back.  Pain occurs every other week and pain last for 2 to 3 days.  Pain is continuous.  Pain happens irrespective of eating food or bowel activity.  She feels bloated.  She feels nauseous.  She has poor appetite.  She has irregular bowel movements with constipation alternating with diarrhea.  She has

## 2025-05-06 ENCOUNTER — HOSPITAL ENCOUNTER (OUTPATIENT)
Age: 40
Setting detail: SPECIMEN
Discharge: HOME | End: 2025-05-06
Payer: COMMERCIAL

## 2025-05-06 PROCEDURE — 88342 IMHCHEM/IMCYTCHM 1ST ANTB: CPT

## 2025-05-06 PROCEDURE — 88305 TISSUE EXAM BY PATHOLOGIST: CPT

## 2025-05-09 LAB — SURGICAL PATHOLOGY REPORT: NORMAL

## 2025-05-12 ENCOUNTER — RESULTS FOLLOW-UP (OUTPATIENT)
Dept: GASTROENTEROLOGY | Age: 40
End: 2025-05-12

## 2025-05-12 NOTE — RESULT ENCOUNTER NOTE
This message is regarding the biopsy results of the tissue we have obtained during EGD and colonoscopy test.  The stomach lining biopsy or gastric biopsy did not show H. pylori infection.  The biopsies obtained from the duodenal and upper small intestinal lining did not show any evidence of inflammation or celiac disease.  The colon lining biopsies came back normal.  There were no changes of inflammation in the colon.

## (undated) DEVICE — SHEET,DRAPE,53X77,STERILE: Brand: MEDLINE

## (undated) DEVICE — SYRINGE MED 20ML STD CLR PLAS LUERLOCK TIP N CTRL DISP

## (undated) DEVICE — GLOVE SURG SZ 65 THK91MIL LTX FREE SYN POLYISOPRENE

## (undated) DEVICE — GLOVE SURG SZ 65 CRM LTX FREE POLYISOPRENE POLYMER BEAD ANTI

## (undated) DEVICE — TROCAR: Brand: KII® SLEEVE

## (undated) DEVICE — TISSUE RETRIEVAL SYSTEM: Brand: INZII RETRIEVAL SYSTEM

## (undated) DEVICE — TUBING, SUCTION, 9/32" X 10', STRAIGHT: Brand: MEDLINE

## (undated) DEVICE — GLOVE SURG SZ 65 L12IN FNGR THK79MIL GRN LTX FREE

## (undated) DEVICE — TOWEL,OR,DSP,ST,BLUE,STD,6/PK,12PK/CS: Brand: MEDLINE

## (undated) DEVICE — TUBING INSUFFLATOR HEAT HI FLO SET PNEUMOCLEAR

## (undated) DEVICE — SYRINGE MED 30ML STD CLR PLAS LUERLOCK TIP N CTRL DISP

## (undated) DEVICE — CHLORAPREP 26ML ORANGE

## (undated) DEVICE — GOWN,SIRUS,POLYRNF,BRTHSLV,XLN/XL,20/CS: Brand: MEDLINE

## (undated) DEVICE — SUTURE MCRYL SZ 4-0 L27IN ABSRB UD RB-1 L17MM 1/2 CIR Y214H

## (undated) DEVICE — PACK SURG LAP CHOLE

## (undated) DEVICE — SCISSORS ENDOSCP DIA5MM CRV MPLR CAUT W/ RATCH HNDL

## (undated) DEVICE — DISPOSABLE LAPAROSCOPIC CLIP APPLIER WITH 20 CLIPS.: Brand: EPIX® UNIVERSAL CLIP APPLIER

## (undated) DEVICE — TROCAR: Brand: KII FIOS FIRST ENTRY

## (undated) DEVICE — SET TBNG DISP TIP FOR AHTO

## (undated) DEVICE — NEEDLE 23GA 1.25IN HYPO REG BVL ST DISP